# Patient Record
Sex: FEMALE | Race: WHITE | HISPANIC OR LATINO | URBAN - METROPOLITAN AREA
[De-identification: names, ages, dates, MRNs, and addresses within clinical notes are randomized per-mention and may not be internally consistent; named-entity substitution may affect disease eponyms.]

---

## 2019-04-18 RX ORDER — CIPROFLOXACIN LACTATE 400MG/40ML
1 VIAL (ML) INTRAVENOUS
Qty: 0 | Refills: 0 | COMMUNITY
Start: 2019-04-18

## 2019-04-24 ENCOUNTER — INPATIENT (INPATIENT)
Facility: HOSPITAL | Age: 44
LOS: 1 days | Discharge: HOME | DRG: 192 | End: 2019-04-26
Attending: INTERNAL MEDICINE | Admitting: INTERNAL MEDICINE
Payer: COMMERCIAL

## 2019-04-24 VITALS
RESPIRATION RATE: 17 BRPM | TEMPERATURE: 98 F | OXYGEN SATURATION: 99 % | SYSTOLIC BLOOD PRESSURE: 109 MMHG | WEIGHT: 154.98 LBS | HEART RATE: 89 BPM | DIASTOLIC BLOOD PRESSURE: 75 MMHG

## 2019-04-24 DIAGNOSIS — Z90.710 ACQUIRED ABSENCE OF BOTH CERVIX AND UTERUS: Chronic | ICD-10-CM

## 2019-04-24 DIAGNOSIS — K31.84 GASTROPARESIS: ICD-10-CM

## 2019-04-24 DIAGNOSIS — R09.89 OTHER SPECIFIED SYMPTOMS AND SIGNS INVOLVING THE CIRCULATORY AND RESPIRATORY SYSTEMS: ICD-10-CM

## 2019-04-24 DIAGNOSIS — K58.9 IRRITABLE BOWEL SYNDROME WITHOUT DIARRHEA: ICD-10-CM

## 2019-04-24 DIAGNOSIS — Z98.891 HISTORY OF UTERINE SCAR FROM PREVIOUS SURGERY: Chronic | ICD-10-CM

## 2019-04-24 DIAGNOSIS — J45.901 UNSPECIFIED ASTHMA WITH (ACUTE) EXACERBATION: ICD-10-CM

## 2019-04-24 DIAGNOSIS — Z98.890 OTHER SPECIFIED POSTPROCEDURAL STATES: Chronic | ICD-10-CM

## 2019-04-24 DIAGNOSIS — Z29.9 ENCOUNTER FOR PROPHYLACTIC MEASURES, UNSPECIFIED: ICD-10-CM

## 2019-04-24 DIAGNOSIS — D72.829 ELEVATED WHITE BLOOD CELL COUNT, UNSPECIFIED: ICD-10-CM

## 2019-04-24 DIAGNOSIS — Z87.2 PERSONAL HISTORY OF DISEASES OF THE SKIN AND SUBCUTANEOUS TISSUE: Chronic | ICD-10-CM

## 2019-04-24 DIAGNOSIS — Z90.49 ACQUIRED ABSENCE OF OTHER SPECIFIED PARTS OF DIGESTIVE TRACT: Chronic | ICD-10-CM

## 2019-04-24 LAB
BASOPHILS # BLD AUTO: 0 K/UL — SIGNIFICANT CHANGE UP (ref 0–0.2)
BASOPHILS NFR BLD AUTO: 0 % — SIGNIFICANT CHANGE UP (ref 0–2)
EOSINOPHIL # BLD AUTO: 0 K/UL — SIGNIFICANT CHANGE UP (ref 0–0.5)
EOSINOPHIL NFR BLD AUTO: 0.1 % — SIGNIFICANT CHANGE UP (ref 0–6)
GAS PNL BLDV: SIGNIFICANT CHANGE UP
HCT VFR BLD CALC: 45.7 % — HIGH (ref 34.5–45)
HGB BLD-MCNC: 15.8 G/DL — HIGH (ref 11.5–15.5)
LYMPHOCYTES # BLD AUTO: 14.4 % — SIGNIFICANT CHANGE UP (ref 13–44)
LYMPHOCYTES # BLD AUTO: 2.3 K/UL — SIGNIFICANT CHANGE UP (ref 1–3.3)
MCHC RBC-ENTMCNC: 31.6 PG — SIGNIFICANT CHANGE UP (ref 27–34)
MCHC RBC-ENTMCNC: 34.6 GM/DL — SIGNIFICANT CHANGE UP (ref 32–36)
MCV RBC AUTO: 91.3 FL — SIGNIFICANT CHANGE UP (ref 80–100)
MONOCYTES # BLD AUTO: 0.4 K/UL — SIGNIFICANT CHANGE UP (ref 0–0.9)
MONOCYTES NFR BLD AUTO: 2.6 % — SIGNIFICANT CHANGE UP (ref 2–14)
NEUTROPHILS # BLD AUTO: 13.5 K/UL — HIGH (ref 1.8–7.4)
NEUTROPHILS NFR BLD AUTO: 82.9 % — HIGH (ref 43–77)
NT-PROBNP SERPL-SCNC: 20 PG/ML — SIGNIFICANT CHANGE UP (ref 0–300)
PLATELET # BLD AUTO: 313 K/UL — SIGNIFICANT CHANGE UP (ref 150–400)
RAPID RVP RESULT: SIGNIFICANT CHANGE UP
RBC # BLD: 5 M/UL — SIGNIFICANT CHANGE UP (ref 3.8–5.2)
RBC # FLD: 12.3 % — SIGNIFICANT CHANGE UP (ref 10.3–14.5)
TROPONIN T, HIGH SENSITIVITY RESULT: <6 NG/L — SIGNIFICANT CHANGE UP (ref 0–51)
WBC # BLD: 16.3 K/UL — HIGH (ref 3.8–10.5)
WBC # FLD AUTO: 16.3 K/UL — HIGH (ref 3.8–10.5)

## 2019-04-24 PROCEDURE — 71275 CT ANGIOGRAPHY CHEST: CPT | Mod: 26

## 2019-04-24 PROCEDURE — 99285 EMERGENCY DEPT VISIT HI MDM: CPT

## 2019-04-24 PROCEDURE — 99223 1ST HOSP IP/OBS HIGH 75: CPT

## 2019-04-24 PROCEDURE — 71045 X-RAY EXAM CHEST 1 VIEW: CPT | Mod: 26

## 2019-04-24 RX ORDER — ESCITALOPRAM OXALATE 10 MG/1
20 TABLET, FILM COATED ORAL DAILY
Qty: 0 | Refills: 0 | Status: DISCONTINUED | OUTPATIENT
Start: 2019-04-24 | End: 2019-04-26

## 2019-04-24 RX ORDER — IPRATROPIUM/ALBUTEROL SULFATE 18-103MCG
3 AEROSOL WITH ADAPTER (GRAM) INHALATION
Qty: 0 | Refills: 0 | Status: COMPLETED | OUTPATIENT
Start: 2019-04-24 | End: 2019-04-24

## 2019-04-24 RX ORDER — ENOXAPARIN SODIUM 100 MG/ML
40 INJECTION SUBCUTANEOUS EVERY 24 HOURS
Qty: 0 | Refills: 0 | Status: DISCONTINUED | OUTPATIENT
Start: 2019-04-24 | End: 2019-04-26

## 2019-04-24 RX ORDER — ERYTHROMYCIN ETHYLSUCCINATE 400 MG
500 TABLET ORAL
Qty: 0 | Refills: 0 | Status: DISCONTINUED | OUTPATIENT
Start: 2019-04-24 | End: 2019-04-26

## 2019-04-24 RX ORDER — ACETAMINOPHEN 500 MG
650 TABLET ORAL EVERY 6 HOURS
Qty: 0 | Refills: 0 | Status: DISCONTINUED | OUTPATIENT
Start: 2019-04-24 | End: 2019-04-26

## 2019-04-24 RX ORDER — FAMOTIDINE 10 MG/ML
20 INJECTION INTRAVENOUS DAILY
Qty: 0 | Refills: 0 | Status: DISCONTINUED | OUTPATIENT
Start: 2019-04-24 | End: 2019-04-26

## 2019-04-24 RX ORDER — SODIUM CHLORIDE 9 MG/ML
1000 INJECTION INTRAMUSCULAR; INTRAVENOUS; SUBCUTANEOUS ONCE
Qty: 0 | Refills: 0 | Status: COMPLETED | OUTPATIENT
Start: 2019-04-24 | End: 2019-04-24

## 2019-04-24 RX ORDER — IPRATROPIUM/ALBUTEROL SULFATE 18-103MCG
3 AEROSOL WITH ADAPTER (GRAM) INHALATION EVERY 4 HOURS
Qty: 0 | Refills: 0 | Status: DISCONTINUED | OUTPATIENT
Start: 2019-04-24 | End: 2019-04-26

## 2019-04-24 RX ORDER — MAGNESIUM SULFATE 500 MG/ML
2 VIAL (ML) INJECTION ONCE
Qty: 0 | Refills: 0 | Status: COMPLETED | OUTPATIENT
Start: 2019-04-24 | End: 2019-04-24

## 2019-04-24 RX ADMIN — Medication 650 MILLIGRAM(S): at 22:14

## 2019-04-24 RX ADMIN — Medication 3 MILLILITER(S): at 20:19

## 2019-04-24 RX ADMIN — ESCITALOPRAM OXALATE 20 MILLIGRAM(S): 10 TABLET, FILM COATED ORAL at 22:12

## 2019-04-24 RX ADMIN — Medication 1 DROP(S): at 22:13

## 2019-04-24 RX ADMIN — SODIUM CHLORIDE 1000 MILLILITER(S): 9 INJECTION INTRAMUSCULAR; INTRAVENOUS; SUBCUTANEOUS at 15:29

## 2019-04-24 RX ADMIN — Medication 3 MILLILITER(S): at 15:15

## 2019-04-24 RX ADMIN — Medication 650 MILLIGRAM(S): at 22:45

## 2019-04-24 RX ADMIN — ENOXAPARIN SODIUM 40 MILLIGRAM(S): 100 INJECTION SUBCUTANEOUS at 20:19

## 2019-04-24 RX ADMIN — Medication 3 MILLILITER(S): at 15:00

## 2019-04-24 RX ADMIN — Medication 100 MILLIGRAM(S): at 20:24

## 2019-04-24 RX ADMIN — Medication 50 GRAM(S): at 15:34

## 2019-04-24 RX ADMIN — FAMOTIDINE 20 MILLIGRAM(S): 10 INJECTION INTRAVENOUS at 22:15

## 2019-04-24 RX ADMIN — Medication 3 MILLILITER(S): at 14:50

## 2019-04-24 RX ADMIN — Medication 40 MILLIGRAM(S): at 22:13

## 2019-04-24 RX ADMIN — Medication 500 MILLIGRAM(S): at 22:12

## 2019-04-24 RX ADMIN — Medication 125 MILLIGRAM(S): at 15:28

## 2019-04-24 NOTE — H&P ADULT - NSICDXPASTSURGICALHX_GEN_ALL_CORE_FT
PAST SURGICAL HISTORY:  H/O  section     History of appendectomy     History of cyst of breast Bilateral s/p removal    History of hysterectomy     History of meniscectomy of right knee     S/P cholecystectomy

## 2019-04-24 NOTE — ED PROVIDER NOTE - CLINICAL SUMMARY MEDICAL DECISION MAKING FREE TEXT BOX
Cory: 43 year old female with pmhx of asthma here with sob x 5 days worsening. trying nebs at home with no improvement. feels like something sitting on chest preventing her from taking a full breath. saturating 100% on RA. will get labs, cxr, duonebs, steroids, Mg, CTA to evaluate for PE, reasess, low threshold to admit patient.

## 2019-04-24 NOTE — H&P ADULT - PROBLEM SELECTOR PLAN 4
-Likely due to steroids patient received and was on at home.   -Monitor off of abx for now.  -Monitor CBC.  -Repeat lactate which was mildly elevated on admission.

## 2019-04-24 NOTE — H&P ADULT - PROBLEM SELECTOR PLAN 3
-Home med of Trulance not on formulary here, patient can likely take if she has her own supply.  -C/w home Lexapro.

## 2019-04-24 NOTE — H&P ADULT - NSHPPHYSICALEXAM_GEN_ALL_CORE
PHYSICAL EXAM:  Vital Signs Last 24 Hrs  T(C): 36.8 (04-24-19 @ 20:07)  T(F): 98.3 (04-24-19 @ 20:07), Max: 98.3 (04-24-19 @ 20:07)  HR: 103 (04-24-19 @ 20:07) (88 - 103)  BP: 137/82 (04-24-19 @ 20:07)  BP(mean): --  RR: 20 (04-24-19 @ 20:07) (17 - 26)  SpO2: 97% (04-24-19 @ 20:07) (97% - 99%)  Wt(kg): --    Constitutional: NAD, awake and alert, speaking in full sentences  EYES: EOMI  ENT:  Normal Hearing   Neck: Soft and supple  Respiratory: Clear but diminished BS bilaterally. ?Trace wheeze.   Cardiovascular: S1S2 normal, mildly tachy rate and rhythm, no Murmurs, gallops or rubs  Gastrointestinal: Bowel Sounds present, soft, nontender, nondistended, no guarding, no rebound  Extremities: No LE edema. ?tenderness of LE's.   Neurological: AAO x 3, no focal deficits  Skin: No rashes  Psych: No depression or anhedonia  Heme: Trace bruises on LE's.

## 2019-04-24 NOTE — H&P ADULT - NSHPLABSRESULTS_GEN_ALL_CORE
LABS:                        15.8   16.3  )-----------( 313      ( 24 Apr 2019 15:38 )             45.7       04-24    137  |  100  |  16  ----------------------------<  117<H>  3.7   |  23  |  0.68    Ca    10.0      24 Apr 2019 15:38    TPro  7.9  /  Alb  4.7  /  TBili  1.0  /  DBili  x   /  AST  16  /  ALT  12  /  AlkPhos  105  04-24          CAPILLARY BLOOD GLUCOSE    Lactate Trend    RADIOLOGY:    < from: CT Angio Chest w/ IV Cont (04.24.19 @ 19:09) >    IMPRESSION:   No evidence of pulmonary embolus, as clinically questioned.    < end of copied text >    < from: Xray Chest 1 View AP/PA (04.24.19 @ 16:56) >    INTERPRETATION:  no emergent findings    < end of copied text >    CARDIOLOGY:    EKG reviewed by me: NSR HR 90bpm. QTc 442. Sinus arrythmia. ?TWI V1.

## 2019-04-24 NOTE — H&P ADULT - PROBLEM SELECTOR PLAN 1
-Patient presents with acute on chronic asthma, with worsening SOB/MELENDREZ, cough, and wheeze.   -Improved with supplemental O2, continue for now and pulse ox.   -S/p Solumedrol 125mg IV in ED. -C/w Solumedrol 40mg IV Q8H for now.   -C/w Duonebs Q4H.   -Patient was advised by her pulmonologist (Dr. Miller) to be seen by Dr. Perkins ([place] [facility]). Place consult in am.   -Cough meds PRN.   -Home meds of Daliresp, Lonhala, Perforomist not on formulary here. -Clarify with pulmonology if should continue giving these or change her meds. If to continue, then perhaps patient can take her own meds if she has them.   -Gets Xolair injections every 2 weeks as an outpatient.   -Hold Levaquin which patient was getting since 4/18, as no signs of PNA on imaging. -Will check procalcitonin.   -CTA chest negative for PE. -Will check US duplex of LE's to r/o DVT. Has had recent long car rides. -Will also check D-dimer, though non-specific.   -Chest tightness likely due to asthma exacerbation. No acute ischemic changes seen on EKG. Trops negative. BNP normal. -Obtain echocardiogram report patient recently had done.

## 2019-04-24 NOTE — H&P ADULT - NSHPREVIEWOFSYSTEMS_GEN_ALL_CORE
REVIEW OF SYSTEMS:    CONSTITUTIONAL: No fevers or chills  ENMT:  No ear pain, No vertigo or throat pain  EYES: No visual changes  or photophobia  NECK: No pain or stiffness  RESPIRATORY: Cough, wheezing, and shortness of breath/MELENDREZ present.   CARDIOVASCULAR: Chest tightness.   GASTROINTESTINAL: Patient has history of symptoms from gastroparesis and IBS.   MUSCULOSKELETAL: No joint swelling or warmth.  GENITOURINARY: Overactive bladder symptoms.   NEUROLOGICAL: No numbness or weakness  PSYCHIATRIC: No depression or anhedonia.  ENDOCRINE: No sx hypoglycemic episodes.  SKIN: No itching, burning, rashes, or lesions

## 2019-04-24 NOTE — ED PROVIDER NOTE - PROGRESS NOTE DETAILS
PA Barretta: peak flow 175 prior to treatment AKILA Mccall: patient remains tachypneic with diminished breath sounds at the bases b/l. SpO2 98%. Patient stated symptoms minimally improved and continues to endorse chest tightness. Made ED attending Dr. Ray aware who agreed with CTA chest r/o PE AKILA Mccall: called Dr. Acuña who recommended admission to Dr. Medina

## 2019-04-24 NOTE — H&P ADULT - ATTENDING COMMENTS
Dickson Smith MD  [facility]  Cell: [phone]  Pager: [phone]  Office: [phone]

## 2019-04-24 NOTE — ED PROVIDER NOTE - OBJECTIVE STATEMENT
44 y/o female PMHx asthma (last hospitalization 5 years ago), right sided pneumothorax during child birth requiring chest tube/intubation presented to the ED for worsening SOB x5 days. Patient stated she feels dizzy, lightheaded, chest tightness, MELENDREZ, difficulty speaking, nonproductive cough and wheezing with no improvement of nebs (last dose at 11AM) prompting pt to present t the ED. Patient stated 5 days ago she had five asthma exacerbations while seeing her sister in [place] which is 5 hour car ride. Patient pulm Dr. Manuel Miller aware patient is in the ED. Patient gets Omalizumab injections every 2 weeks (last dose 6 days ago) and takes daily  Longhala, Formoterol, albuterol prn, and proair as rescue. Patient denied fevers, abdominal pain, sick contacts, N/V/D, urinary complaints 42 y/o female PMHx asthma (last hospitalization 5 years ago), right sided pneumothorax during child birth requiring chest tube/intubation presented to the ED for worsening SOB x5 days. Patient stated she feels dizzy, lightheaded, chest tightness, MELENDREZ, difficulty speaking, nonproductive cough and wheezing with no improvement of nebs (last dose at 11AM) prompting pt to present t the ED. Patient stated 5 days ago she had five asthma exacerbations while seeing her sister in [place] which is 5 hour car ride. Patient pulm Dr. Manuel Miller aware patient is in the ED. Patient gets Omalizumab injections every 2 weeks (last dose 6 days ago) and takes daily  Longhala, Formoterol, albuterol prn, and proair as rescue. Patient denied fevers, abdominal pain, sick contacts, N/V/D, urinary complaints    Dr. Miller recommended admission to Dr. Acuña

## 2019-04-24 NOTE — H&P ADULT - NSICDXFAMILYHX_GEN_ALL_CORE_FT
FAMILY HISTORY:  Family history of asthma in mother  Family history of heart disease  Family history of hyperthyroidism

## 2019-04-24 NOTE — H&P ADULT - NSICDXPASTMEDICALHX_GEN_ALL_CORE_FT
PAST MEDICAL HISTORY:  Asthma     Gastroparesis     GERD without esophagitis     IBS (irritable bowel syndrome)     Overactive bladder     Plantar fasciitis, right     Pneumothorax on right 2006

## 2019-04-24 NOTE — ED ADULT NURSE REASSESSMENT NOTE - NS ED NURSE REASSESS COMMENT FT1
Pt received bed assignment. Pt aware. Report given to RNDorie MCARTHUR. Pt stable for transport. Chart given to charge desk. Will cont to monitor. Patient taken to CT then to be transported to floor.

## 2019-04-24 NOTE — H&P ADULT - ASSESSMENT
43 year old female with PMH of asthma, right sided pneumothorax during child birth requiring chest tube/intubation, gastroparesis, GERD, IBS, overactive bladder, ?endometriosis; recent trip from [place] about 5 days ago and while there had asthma exacerbation put on prednisone/Levaquin for 10 day course; presents with worsening SOB/MELENDREZ, cough, difficulty speaking, chest tightness, and wheezing; sent by her pulmonologist Dr. Miller to be admitted and seen by Dr. Perkins. Patient reports the past month her asthma has been worsening. Admitted for asthma exacerbation.

## 2019-04-24 NOTE — ED ADULT NURSE NOTE - OBJECTIVE STATEMENT
43 year old female patient presents ambulatory to ED c/o SOB and asthma exacerbation x 5 days. Patient state she used her rescue inhaler and has had little relief- last as 11am. Patient reports associated CP and dizziness. Patient able to speak in full sentences but with some difficulty. Patient denies sick contacts, fevers, or chills. Patient aware of plan of care.

## 2019-04-24 NOTE — ED ADULT NURSE NOTE - NSIMPLEMENTINTERV_GEN_ALL_ED
Implemented All Universal Safety Interventions:  [place] to call system. Call bell, personal items and telephone within reach. Instruct patient to call for assistance. Room bathroom lighting operational. Non-slip footwear when patient is off stretcher. Physically safe environment: no spills, clutter or unnecessary equipment. Stretcher in lowest position, wheels locked, appropriate side rails in place.

## 2019-04-24 NOTE — H&P ADULT - HISTORY OF PRESENT ILLNESS
43 year old female with PMH of asthma, right sided pneumothorax during child birth requiring chest tube/intubation, gastroparesis, GERD, IBS, overactive bladder, ?endometriosis; recent trip from [place] about 5 days ago and while there had asthma exacerbation put on prednisone/Levaquin for 10 day course; presents with worsening SOB/MELENDREZ, cough, difficulty speaking, chest tightness, and wheezing; sent by her pulmonologist Dr. Miller to be admitted and seen by Dr. Perkins. Patient reports the past month her asthma has been worsening.   In ED, patient received Solumedrol 125mg IV, Mg 2gm, Duonebs, and 1LNS.   She was recently seen by a cardiologist but did not complete the stress test.

## 2019-04-25 DIAGNOSIS — K21.9 GASTRO-ESOPHAGEAL REFLUX DISEASE WITHOUT ESOPHAGITIS: ICD-10-CM

## 2019-04-25 DIAGNOSIS — J45.909 UNSPECIFIED ASTHMA, UNCOMPLICATED: ICD-10-CM

## 2019-04-25 LAB
ANION GAP SERPL CALC-SCNC: 15 MMOL/L — SIGNIFICANT CHANGE UP (ref 5–17)
BUN SERPL-MCNC: 12 MG/DL — SIGNIFICANT CHANGE UP (ref 7–23)
CALCIUM SERPL-MCNC: 8.8 MG/DL — SIGNIFICANT CHANGE UP (ref 8.4–10.5)
CHLORIDE SERPL-SCNC: 107 MMOL/L — SIGNIFICANT CHANGE UP (ref 96–108)
CO2 SERPL-SCNC: 19 MMOL/L — LOW (ref 22–31)
CREAT SERPL-MCNC: 0.53 MG/DL — SIGNIFICANT CHANGE UP (ref 0.5–1.3)
D DIMER BLD IA.RAPID-MCNC: <150 NG/ML DDU — SIGNIFICANT CHANGE UP
GLUCOSE SERPL-MCNC: 143 MG/DL — HIGH (ref 70–99)
HCT VFR BLD CALC: 42 % — SIGNIFICANT CHANGE UP (ref 34.5–45)
HGB BLD-MCNC: 14 G/DL — SIGNIFICANT CHANGE UP (ref 11.5–15.5)
LACTATE SERPL-SCNC: 2.6 MMOL/L — HIGH (ref 0.7–2)
MCHC RBC-ENTMCNC: 30.8 PG — SIGNIFICANT CHANGE UP (ref 27–34)
MCHC RBC-ENTMCNC: 33.3 GM/DL — SIGNIFICANT CHANGE UP (ref 32–36)
MCV RBC AUTO: 92.5 FL — SIGNIFICANT CHANGE UP (ref 80–100)
PLATELET # BLD AUTO: 310 K/UL — SIGNIFICANT CHANGE UP (ref 150–400)
POTASSIUM SERPL-MCNC: 4.3 MMOL/L — SIGNIFICANT CHANGE UP (ref 3.5–5.3)
POTASSIUM SERPL-SCNC: 4.3 MMOL/L — SIGNIFICANT CHANGE UP (ref 3.5–5.3)
PROCALCITONIN SERPL-MCNC: 0.04 NG/ML — SIGNIFICANT CHANGE UP (ref 0.02–0.1)
RBC # BLD: 4.54 M/UL — SIGNIFICANT CHANGE UP (ref 3.8–5.2)
RBC # FLD: 13.5 % — SIGNIFICANT CHANGE UP (ref 10.3–14.5)
SODIUM SERPL-SCNC: 141 MMOL/L — SIGNIFICANT CHANGE UP (ref 135–145)
WBC # BLD: 17.32 K/UL — HIGH (ref 3.8–10.5)
WBC # FLD AUTO: 17.32 K/UL — HIGH (ref 3.8–10.5)

## 2019-04-25 PROCEDURE — 99223 1ST HOSP IP/OBS HIGH 75: CPT | Mod: GC

## 2019-04-25 PROCEDURE — 93970 EXTREMITY STUDY: CPT | Mod: 26

## 2019-04-25 RX ORDER — IBUPROFEN 200 MG
400 TABLET ORAL ONCE
Qty: 0 | Refills: 0 | Status: COMPLETED | OUTPATIENT
Start: 2019-04-25 | End: 2019-04-25

## 2019-04-25 RX ORDER — PANTOPRAZOLE SODIUM 20 MG/1
40 TABLET, DELAYED RELEASE ORAL
Qty: 0 | Refills: 0 | Status: DISCONTINUED | OUTPATIENT
Start: 2019-04-25 | End: 2019-04-26

## 2019-04-25 RX ORDER — ERYTHROMYCIN ETHYLSUCCINATE 400 MG
1 TABLET ORAL
Qty: 0 | Refills: 0 | COMMUNITY

## 2019-04-25 RX ORDER — ESCITALOPRAM OXALATE 10 MG/1
1 TABLET, FILM COATED ORAL
Qty: 0 | Refills: 0 | COMMUNITY

## 2019-04-25 RX ORDER — BUDESONIDE AND FORMOTEROL FUMARATE DIHYDRATE 160; 4.5 UG/1; UG/1
2 AEROSOL RESPIRATORY (INHALATION)
Qty: 0 | Refills: 0 | Status: DISCONTINUED | OUTPATIENT
Start: 2019-04-25 | End: 2019-04-26

## 2019-04-25 RX ADMIN — Medication 400 MILLIGRAM(S): at 00:51

## 2019-04-25 RX ADMIN — BUDESONIDE AND FORMOTEROL FUMARATE DIHYDRATE 2 PUFF(S): 160; 4.5 AEROSOL RESPIRATORY (INHALATION) at 17:14

## 2019-04-25 RX ADMIN — Medication 650 MILLIGRAM(S): at 05:34

## 2019-04-25 RX ADMIN — Medication 100 MILLIGRAM(S): at 05:10

## 2019-04-25 RX ADMIN — Medication 400 MILLIGRAM(S): at 01:30

## 2019-04-25 RX ADMIN — Medication 3 MILLILITER(S): at 15:45

## 2019-04-25 RX ADMIN — Medication 3 MILLILITER(S): at 11:34

## 2019-04-25 RX ADMIN — PANTOPRAZOLE SODIUM 40 MILLIGRAM(S): 20 TABLET, DELAYED RELEASE ORAL at 15:45

## 2019-04-25 RX ADMIN — Medication 500 MILLIGRAM(S): at 05:04

## 2019-04-25 RX ADMIN — Medication 3 MILLILITER(S): at 00:15

## 2019-04-25 RX ADMIN — Medication 1 DROP(S): at 22:18

## 2019-04-25 RX ADMIN — Medication 1 DROP(S): at 05:07

## 2019-04-25 RX ADMIN — Medication 400 MILLIGRAM(S): at 18:20

## 2019-04-25 RX ADMIN — Medication 500 MILLIGRAM(S): at 17:14

## 2019-04-25 RX ADMIN — ESCITALOPRAM OXALATE 20 MILLIGRAM(S): 10 TABLET, FILM COATED ORAL at 11:34

## 2019-04-25 RX ADMIN — Medication 650 MILLIGRAM(S): at 06:13

## 2019-04-25 RX ADMIN — Medication 40 MILLIGRAM(S): at 05:04

## 2019-04-25 RX ADMIN — Medication 40 MILLIGRAM(S): at 15:45

## 2019-04-25 RX ADMIN — Medication 3 MILLILITER(S): at 04:00

## 2019-04-25 RX ADMIN — FAMOTIDINE 20 MILLIGRAM(S): 10 INJECTION INTRAVENOUS at 11:34

## 2019-04-25 RX ADMIN — Medication 3 MILLILITER(S): at 07:55

## 2019-04-25 RX ADMIN — ENOXAPARIN SODIUM 40 MILLIGRAM(S): 100 INJECTION SUBCUTANEOUS at 22:18

## 2019-04-25 RX ADMIN — Medication 1 DROP(S): at 15:46

## 2019-04-25 RX ADMIN — Medication 3 MILLILITER(S): at 22:18

## 2019-04-25 NOTE — CONSULT NOTE ADULT - ATTENDING COMMENTS
Patient seen and examined. Imaging and laboratory data reviewed. Patient has a reported history of severe persistent asthma and is on multiple bronchodilators (some overlapping) and Xolair. Over the last few months she has noted progression in her symptoms to the point of exercise limitation. She notes difficulty walking up stairs or doing physical activity. She feels better with supplemental oxygen but has not had any episodes of desaturation here at [facility] and has not qualified for O2 as an outpatient. Her symptoms consist of dyspnea, palpitations, cough and chest pressure. She states she DOES NOT get any wheezing during these episodes. She had a CTPA that did not reveal PE or significant parenchymal disease but did show linear atelectasis. She has had recent cardiac workup (stress test and echocardiogram) which were normal. She is admitted for an asthma exacerbation but her symptoms are not fully consistent with asthma.     - Given that she has had similar symptoms for 13 years without a clear answer and a progression over the last few months I am not sure we will be able to find an answer to her illness. However, I do think she would benefit from ENT evaluation for VCD and from Psychiatry evaluation for her anxiety. She states that she does have fairly significant anxiety which can complicate her respiratory status.    - From a pulmonary standpoint I would suggest trying to simplify her medications: Please change Solumedrol to Prednisone 40mg QD and taper by 10mg Q3 days. I do not hear any wheezing but am hesitant to stop outright as she keeps going back and forth on oral steroids. Would put her on Duonebs Q6 ATC and Symbicort BID. Would hold ALL her home therapies (she was on Lonhala (LAMA), Spiriva (LAMA), Performist (ICS), Duoneb (KENNETH/CASANDRA), Xolair, and Breo (LABA/ICS). We will need to see her pulmonary function testing. She may also require outpatient Cardiopulmonary Exercise Testing (CPET) for further diagnostic workup. Today she asked us about the potential of bronchial thermoplasty, which is used in some cases of severe asthma. However, I would need more information regarding her asthma prior to making a comment whether this would be useful or not. However, her absence of wheezing is confusing.    - I would also suggest a more aggressive treatment of her GERD. She states she does have chronic heartburn and gastroparesis which is all likely worsened by her steroid usage and anxiety.    - Suspected sleep disorder breathing. Would suggest continuing patients home NIPPV. She states she is on CPAP 5 cm H20.

## 2019-04-25 NOTE — CONSULT NOTE ADULT - ASSESSMENT
43F with diagnosis of Asthma and obstructive lung disease on several long acting anticholinergics/muscarinics and KENNETH with recurrent tachycardia, heart palpitations, difficulty breathing and dyspnea associated with anxiety or exertion with no evidence of cardiomyopathy, parenchymal lung inflammation, chest lympadenopathy or disease but with reported obstructive lung disease on inhaled/oral steroids without wheezing. Differential diagnosis includes anxiety, endocrine pathology, bronchial asthma.    - RVP negative and no evidence of hypoxia or pulmonary arterial thromboembolism.  - Please continue Duonebs q 6 for the next 24 hours. Please stop Solumedrol and switch to Prednisone 40mg daily.  - Please hold all long acting antimuscarinic/anticholinergic meds for now.  - Please have ENT evaluate for possible vocal cord dysfunction with a laryngoscopy.  - Please obtain serum/urine Metanephrines, TSH, Serum 5-HIAA,   - Please also check JAIRO, Sjogren's antibodies, ISAIAS.  - Please start Protonix and Pepcid for her GERD which could be worsened by anxiety.  - Patient will try to obtain a copy of her PFT's and Sleep study. 43F with diagnosis of Asthma and obstructive lung disease on several long acting anticholinergics/muscarinics and KENNETH with recurrent tachycardia, heart palpitations, difficulty breathing and dyspnea associated with anxiety or exertion with no evidence of cardiomyopathy, parenchymal lung inflammation, chest lympadenopathy or disease but with reported obstructive lung disease on inhaled/oral steroids without wheezing. Differential diagnosis includes anxiety, endocrine pathology, bronchial asthma.    - RVP negative and no evidence of hypoxia or pulmonary arterial thromboembolism.  - CBC differential doesn't show elevated Eosinophils but patient is on Xolair.   - Please continue Duonebs q 6 for the next 24 hours. Please stop Solumedrol and switch to Prednisone 40mg daily.  - Please hold all long acting antimuscarinic/anticholinergic meds for now.  - Please have ENT evaluate for possible vocal cord dysfunction with a laryngoscopy.  - Please obtain serum/urine Metanephrines, TSH, Serum 5-HIAA, AM cortisol.  - Please also check JAIRO, Sjogren's antibodies, ISAIAS.  - Please start Protonix and Pepcid for her GERD which could be worsened by anxiety.  - Patient will try to obtain a copy of her PFT's and Sleep study. 43F with diagnosis of Asthma and obstructive lung disease on several long acting anticholinergics/muscarinics and KENNETH with recurrent tachycardia, heart palpitations, difficulty breathing and dyspnea associated with anxiety or exertion with no evidence of cardiomyopathy, parenchymal lung inflammation, chest lympadenopathy or disease but with reported obstructive lung disease on inhaled/oral steroids without wheezing. Differential diagnosis includes anxiety, endocrine pathology, bronchial asthma.    - RVP negative and no evidence of hypoxia or pulmonary arterial thromboembolism.  - CBC differential doesn't show elevated Eosinophils but patient is on Xolair.   - Please continue Duonebs q 6 for the next 24 hours. Please stop Solumedrol and switch to Prednisone 40mg daily.  - Please hold all long acting antimuscarinic/anticholinergic meds for now - and start patient on Symbicort BID  - Please have ENT evaluate for possible vocal cord dysfunction with a laryngoscopy.  - Please obtain serum/urine Metanephrines, TSH, Serum 5-HIAA, AM cortisol.  - Please also check JAIRO, Sjogren's antibodies, ISAIAS.  - Please start Protonix and Pepcid for her GERD which could be worsened by anxiety.  - Patient will try to obtain a copy of her PFT's and Sleep study.

## 2019-04-25 NOTE — CONSULT NOTE ADULT - SUBJECTIVE AND OBJECTIVE BOX
CHIEF COMPLAINT: dyspnea    HPI:  43F with h/o Psoriatic arthritis not meds, chronic dry eyes/mouth, Gastroparesis, IBS, GERD, h/o spontaneous PTX during Labor 13yrs ago, Endometriosis/fibroids s/p hysterectomy and Asthma since childhood presents with 3 months of asthma exacerbation acutely worsened in the past 3 weeks before admission yesterday.  Patient describes episodes of dyspnea, chest tightness/pressure, heart palpitations and feelings of gasping with lack of air usually associated with anything that increases her heart rate. She never wheezes. She has never wheezed ever since being diagnosed with Asthma. She has symptoms at work, in her car, at home. She has no specific trigger. She is on Darilesp, Lonhala, Spiriva, Duonebs, Albuterol rescue inhaler. She use to be on Breo Ellipta but this was stopped when she got placed on Xolair 1.5yrs ago. She gets Xolair injections q 2weeks and got her last injection 1 week ago. She has also been on occasional oral steroids. She has gained 20lbs in the last 8 months also.  The most recent exacerbation started 3 weeks ago. [place] and was [place] with antibiotics without relief. She saw her Allergist for the Xolair injections and was placed on a Medrol dose pack taper which she was taking up till admission. She was at work and felt like she could not breath, severe dyspnea with exertion prompting hospitalization.  She  has never been intubated for an asthma attack. She was started on Escitalopram which she takes q other day to help manage some anxiety. She has had a TTE, stress test and Man monitoring in the past 3 weeks. The TTE showed normal LV function with EF of 62% and no evidence of Right heart disease or pulmonary HTN. The stress test was negative for ischemia but HR increased to 150. Per patient, she has had PFT's in the past most recent 3 weeks ago showing reduced volumes and signs of obstruction. She has also had a PSG and has a CPAP at home with low pressure but this doesn't help her. She is not on oxygen at home.  Since admission, patient received Solumedrol 125, Duonebs and Magnesium. After Magnesium and oxygen, she is feeling better compared to on admission.  She denies rash but has had recent easy bruising.    PAST MEDICAL & SURGICAL HISTORY:  Plantar fasciitis, right  Overactive bladder  IBS (irritable bowel syndrome)  Gastroparesis  GERD without esophagitis  Pneumothorax on right:   Asthma  History of cyst of breast: Bilateral s/p removal  History of meniscectomy of right knee  History of appendectomy  H/O  section  S/P cholecystectomy  History of hysterectomy      FAMILY HISTORY:  Family history of heart disease  Family history of hyperthyroidism  Family history of asthma in mother      SOCIAL HISTORY:  Smoking: [x ] Never Smoked   Substance Use: [ ] Never Used [ ] Used ____  EtOH Use:  Marital Status: [ ] Single [x]  [ ]  [ ]   Sexual History:   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  Recent Travel:  Country of Birth:  Advance Directives:    Allergies    clindamycin (Rash)  latex (Rash)  lidocaine (Stomach Upset; Vomiting; Nausea)    Intolerances        HOME MEDICATIONS:    REVIEW OF SYSTEMS:  Constitutional: [ ] negative [ ] fevers [ ] chills [ ] weight loss [x ] weight gain  HEENT: [x ] negative [ ] dry eyes [ ] eye irritation [ ] postnasal drip [ ] nasal congestion  CV: [ ] negative  [x ] chest pain [ ] orthopnea [x ] palpitations [ ] murmur  Resp: [ ] negative [ x] cough [ ] shortness of breath [x ] dyspnea [ ] wheezing [ ] sputum [ ] hemoptysis  GI: [ ] negative [ ] nausea [ ] vomiting [ ] diarrhea [ ] constipation [ ] abd pain [ ] dysphagia   : [x ] negative [ ] dysuria [ ] nocturia [ ] hematuria [ ] increased urinary frequency  Musculoskeletal: [x ] negative [ ] back pain [ ] myalgias [ ] arthralgias [ ] fracture  Skin: [ ] negative [ ] rash [x ] bruising  Neurological: [ ] negative [x ] headache [ ] dizziness [ ] syncope [ ] weakness [ ] numbness  Psychiatric: [ ] negative [x ] anxiety [ ] depression  Endocrine: [x ] negative [ ] diabetes [ ] thyroid problem  Hematologic/Lymphatic: [ ] negative [ ] anemia [ ] bleeding problem  Allergic/Immunologic: [ ] negative [ ] itchy eyes [ ] nasal discharge [ ] hives [ ] angioedema  [ ] All other systems negative  [ ] Unable to assess ROS because ________    OBJECTIVE:  T(C): 36.8 (2019 20:07), Max: 36.8 (2019 20:07)  T(F): 98.3 (2019 20:07), Max: 98.3 (2019 20:07)  HR: 103 (2019 20:07) (88 - 103)  BP: 137/82 (2019 20:07) (109/75 - 137/82)  RR: 20 (2019 20:07) (17 - 26)  SpO2: 97% (2019 20:07) (97% - 99%)         @ 07:01  -   @ 07:00  --------------------------------------------------------  IN: 270 mL / OUT: 0 mL / NET: 270 mL      CAPILLARY BLOOD GLUCOSE          PHYSICAL EXAM:  General: NAD. Speaking in complete sentences  HEENT: MONIQUE  Lymph Nodes:  Neck: No JVD  Respiratory: CTA b/l. No wheezing. Moving air to bases.  Cardiovascular: RRR no m/r/g  Abdomen: S/NT/ND  Extremities: -C/C/E  Skin: Scattered rare bruise on lower legs, thighs, chin.  Neurological: non-focal  Psychiatry: anxious    HOSPITAL MEDICATIONS:  enoxaparin Injectable 40 milliGRAM(s) SubCutaneous every 24 hours    erythromycin     base Tablet 500 milliGRAM(s) Oral two times a day      methylPREDNISolone sodium succinate Injectable 40 milliGRAM(s) IV Push every 8 hours    ALBUTerol/ipratropium for Nebulization 3 milliLiter(s) Nebulizer every 4 hours  benzonatate 100 milliGRAM(s) Oral every 8 hours PRN  guaiFENesin    Syrup 200 milliGRAM(s) Oral every 6 hours PRN    acetaminophen   Tablet .. 650 milliGRAM(s) Oral every 6 hours PRN  escitalopram 20 milliGRAM(s) Oral daily    famotidine    Tablet 20 milliGRAM(s) Oral daily            artificial  tears Solution 1 Drop(s) Both EYES three times a day        LABS:                        14.0   17.32 )-----------( 310      ( 2019 09:38 )             42.0     Hgb Trend: 14.0<--, 15.8<--      141  |  107  |  12  ----------------------------<  143<H>  4.3   |  19<L>  |  0.53    Ca    8.8      2019 06:31    TPro  7.9  /  Alb  4.7  /  TBili  1.0  /  DBili  x   /  AST  16  /  ALT  12  /  AlkPhos  105      Creatinine Trend: 0.53<--, 0.68<--        Venous Blood Gas:   @ 15:38  7.43/40/34/26/63  VBG Lactate: 2.8      MICROBIOLOGY:     RADIOLOGY:  [x ] Reviewed and interpreted by me  LUNGS AND LARGE AIRWAYS: Patent central airways.  No pulmonary nodules.   Dependent atelectasis at the lung bases.  PLEURA: No pleural effusion.  VESSELS: No pulmonary embolus.  HEART: Heart size is normal. No pericardial effusion.  MEDIASTINUM AND TAWANNA: No lymphadenopathy.  CHEST WALL AND LOWER NECK: Within normal limits.  VISUALIZED UPPER ABDOMEN: Within normal limits.  BONES: Within normal limits.    PULMONARY FUNCTION TESTS:    EKG: CHIEF COMPLAINT: dyspnea    HPI:  43F with h/o Psoriatic arthritis not meds, chronic dry eyes/mouth, Gastroparesis, IBS, GERD, h/o spontaneous PTX during Labor 13yrs ago, Endometriosis/fibroids s/p hysterectomy and Asthma since childhood presents with 3 months of asthma exacerbation acutely worsened in the past 3 weeks before admission yesterday.  Patient describes episodes of dyspnea, chest tightness/pressure, heart palpitations and feelings of gasping with lack of air usually associated with anything that increases her heart rate. She never wheezes. She has never wheezed ever since being diagnosed with Asthma. She has symptoms at work, in her car, at home. She has no specific trigger. She is on Darilesp, Lonhala, Spiriva, Duonebs, Albuterol rescue inhaler. She use to be on Breo Ellipta but this was stopped when she got placed on Xolair 1.5yrs ago. She gets Xolair injections q 2weeks and got her last injection 1 week ago. She has also been on occasional oral steroids. She has gained 20lbs in the last 8 months also.  The most recent exacerbation started 3 weeks ago. She travelled to another state and was treated with antibiotics without relief. She saw her Allergist for the Xolair injections and was placed on a Medrol dose pack taper which she was taking up till admission. She was at work and felt like she could not breath, severe dyspnea with exertion prompting hospitalization.  She  has never been intubated for an asthma attack. She was started on Escitalopram which she takes q other day to help manage some anxiety. She has had a TTE, stress test and Man monitoring in the past 3 weeks. The TTE showed normal LV function with EF of 62% and no evidence of Right heart disease or pulmonary HTN. The stress test was negative for ischemia but HR increased to 150. Per patient, she has had PFT's in the past most recent 3 weeks ago showing reduced volumes and signs of obstruction. She has also had a PSG and has a CPAP at home with low pressure but this doesn't help her. She is not on oxygen at home.  Since admission, patient received Solumedrol 125, Duonebs and Magnesium. After Magnesium and oxygen, she is feeling better compared to on admission.  She denies rash but has had recent easy bruising.    PAST MEDICAL & SURGICAL HISTORY:  Plantar fasciitis, right  Overactive bladder  IBS (irritable bowel syndrome)  Gastroparesis  GERD without esophagitis  Pneumothorax on right:   Asthma  History of cyst of breast: Bilateral s/p removal  History of meniscectomy of right knee  History of appendectomy  H/O  section  S/P cholecystectomy  History of hysterectomy      FAMILY HISTORY:  Family history of heart disease  Family history of hyperthyroidism  Family history of asthma in mother      SOCIAL HISTORY:  Smoking: [x ] Never Smoked   Substance Use: [ ] Never Used [ ] Used ____  EtOH Use:  Marital Status: [ ] Single [x]  [ ]  [ ]   Sexual History:   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  Recent Travel:  Country of Birth:  Advance Directives:    Allergies    clindamycin (Rash)  latex (Rash)  lidocaine (Stomach Upset; Vomiting; Nausea)    Intolerances        HOME MEDICATIONS:    REVIEW OF SYSTEMS:  Constitutional: [ ] negative [ ] fevers [ ] chills [ ] weight loss [x ] weight gain  HEENT: [x ] negative [ ] dry eyes [ ] eye irritation [ ] postnasal drip [ ] nasal congestion  CV: [ ] negative  [x ] chest pain [ ] orthopnea [x ] palpitations [ ] murmur  Resp: [ ] negative [ x] cough [ ] shortness of breath [x ] dyspnea [ ] wheezing [ ] sputum [ ] hemoptysis  GI: [ ] negative [ ] nausea [ ] vomiting [ ] diarrhea [ ] constipation [ ] abd pain [ ] dysphagia   : [x ] negative [ ] dysuria [ ] nocturia [ ] hematuria [ ] increased urinary frequency  Musculoskeletal: [x ] negative [ ] back pain [ ] myalgias [ ] arthralgias [ ] fracture  Skin: [ ] negative [ ] rash [x ] bruising  Neurological: [ ] negative [x ] headache [ ] dizziness [ ] syncope [ ] weakness [ ] numbness  Psychiatric: [ ] negative [x ] anxiety [ ] depression  Endocrine: [x ] negative [ ] diabetes [ ] thyroid problem  Hematologic/Lymphatic: [ ] negative [ ] anemia [ ] bleeding problem  Allergic/Immunologic: [ ] negative [ ] itchy eyes [ ] nasal discharge [ ] hives [ ] angioedema  [ x] All other systems negative  [ ] Unable to assess ROS because ________    OBJECTIVE:  T(C): 36.8 (2019 20:07), Max: 36.8 (2019 20:07)  T(F): 98.3 (2019 20:07), Max: 98.3 (2019 20:07)  HR: 103 (2019 20:07) (88 - 103)  BP: 137/82 (2019 20:07) (109/75 - 137/82)  RR: 20 (2019 20:07) (17 - 26)  SpO2: 97% (2019 20:07) (97% - 99%)         @ 07:01  -   @ 07:00  --------------------------------------------------------  IN: 270 mL / OUT: 0 mL / NET: 270 mL      CAPILLARY BLOOD GLUCOSE          PHYSICAL EXAM:  General: NAD. Speaking in complete sentences  HEENT: MONIQUE  Lymph Nodes:  Neck: No JVD  Respiratory: CTA b/l. No wheezing. Moving air to bases.  Cardiovascular: RRR no m/r/g  Abdomen: S/NT/ND  Extremities: -C/C/E  Skin: Scattered rare bruise on lower legs, thighs, chin.  Neurological: non-focal  Psychiatry: anxious    HOSPITAL MEDICATIONS:  enoxaparin Injectable 40 milliGRAM(s) SubCutaneous every 24 hours    erythromycin     base Tablet 500 milliGRAM(s) Oral two times a day      methylPREDNISolone sodium succinate Injectable 40 milliGRAM(s) IV Push every 8 hours    ALBUTerol/ipratropium for Nebulization 3 milliLiter(s) Nebulizer every 4 hours  benzonatate 100 milliGRAM(s) Oral every 8 hours PRN  guaiFENesin    Syrup 200 milliGRAM(s) Oral every 6 hours PRN    acetaminophen   Tablet .. 650 milliGRAM(s) Oral every 6 hours PRN  escitalopram 20 milliGRAM(s) Oral daily    famotidine    Tablet 20 milliGRAM(s) Oral daily            artificial  tears Solution 1 Drop(s) Both EYES three times a day        LABS:                        14.0   17.32 )-----------( 310      ( 2019 09:38 )             42.0     Hgb Trend: 14.0<--, 15.8<--      141  |  107  |  12  ----------------------------<  143<H>  4.3   |  19<L>  |  0.53    Ca    8.8      2019 06:31    TPro  7.9  /  Alb  4.7  /  TBili  1.0  /  DBili  x   /  AST  16  /  ALT  12  /  AlkPhos  105      Creatinine Trend: 0.53<--, 0.68<--        Venous Blood Gas:   @ 15:38  7.43/40/34/26/63  VBG Lactate: 2.8      MICROBIOLOGY:     RADIOLOGY:  [x ] Reviewed and interpreted by me  LUNGS AND LARGE AIRWAYS: Patent central airways.  No pulmonary nodules.   Dependent atelectasis at the lung bases.  PLEURA: No pleural effusion.  VESSELS: No pulmonary embolus.  HEART: Heart size is normal. No pericardial effusion.  MEDIASTINUM AND TAWANNA: No lymphadenopathy.  CHEST WALL AND LOWER NECK: Within normal limits.  VISUALIZED UPPER ABDOMEN: Within normal limits.  BONES: Within normal limits.    PULMONARY FUNCTION TESTS:    EKG:

## 2019-04-25 NOTE — CONSULT NOTE ADULT - ASSESSMENT
43F with h/o Psoriatic arthritis not meds, chronic dry eyes/mouth, Gastroparesis, IBS, GERD, h/o spontaneous PTX during Labor 13yrs ago, Endometriosis/fibroids s/p hysterectomy and Asthma since childhood presents with 3 months of asthma exacerbation acutely worsened in the past 3 weeks. ENT was consulted for Upper Airway evaluation. 43F with h/o Psoriatic arthritis not meds, chronic dry eyes/mouth, Gastroparesis, IBS, GERD, h/o spontaneous PTX during Labor 13yrs ago, Endometriosis/fibroids s/p hysterectomy and Asthma since childhood presents with 3 months of asthma exacerbation acutely worsened in the past 3 weeks. ENT was consulted for Upper Airway evaluation. Indirect Laryngoscopy  with mild erythema at the b/l Arytenoids consistent with GERD.

## 2019-04-25 NOTE — PROGRESS NOTE ADULT - SUBJECTIVE AND OBJECTIVE BOX
Patient is a 43y old  Female who presents with a chief complaint of SOB. (25 Apr 2019 14:55)      INTERVAL HPI/OVERNIGHT EVENTS:  T(C): 37.1 (04-25-19 @ 15:26), Max: 37.3 (04-25-19 @ 13:45)  HR: 90 (04-25-19 @ 15:26) (88 - 103)  BP: 119/72 (04-25-19 @ 15:26) (111/74 - 137/82)  RR: 20 (04-25-19 @ 15:26) (20 - 20)  SpO2: 95% (04-25-19 @ 15:26) (95% - 97%)  Wt(kg): --  I&O's Summary    24 Apr 2019 07:01  -  25 Apr 2019 07:00  --------------------------------------------------------  IN: 270 mL / OUT: 0 mL / NET: 270 mL    25 Apr 2019 07:01  -  25 Apr 2019 17:45  --------------------------------------------------------  IN: 920 mL / OUT: 0 mL / NET: 920 mL        LABS:                        14.0   17.32 )-----------( 310      ( 25 Apr 2019 09:38 )             42.0     04-25    141  |  107  |  12  ----------------------------<  143<H>  4.3   |  19<L>  |  0.53    Ca    8.8      25 Apr 2019 06:31    TPro  7.9  /  Alb  4.7  /  TBili  1.0  /  DBili  x   /  AST  16  /  ALT  12  /  AlkPhos  105  04-24        CAPILLARY BLOOD GLUCOSE                MEDICATIONS  (STANDING):  ALBUTerol/ipratropium for Nebulization 3 milliLiter(s) Nebulizer every 4 hours  artificial  tears Solution 1 Drop(s) Both EYES three times a day  buDESOnide  80 MICROgram(s)/formoterol 4.5 MICROgram(s) Inhaler 2 Puff(s) Inhalation two times a day  enoxaparin Injectable 40 milliGRAM(s) SubCutaneous every 24 hours  erythromycin     base Tablet 500 milliGRAM(s) Oral two times a day  escitalopram 20 milliGRAM(s) Oral daily  famotidine    Tablet 20 milliGRAM(s) Oral daily  ibuprofen  Tablet. 400 milliGRAM(s) Oral once  pantoprazole    Tablet 40 milliGRAM(s) Oral before breakfast  predniSONE   Tablet 40 milliGRAM(s) Oral daily    MEDICATIONS  (PRN):  acetaminophen   Tablet .. 650 milliGRAM(s) Oral every 6 hours PRN Mild Pain (1 - 3), Moderate Pain (4 - 6)  benzonatate 100 milliGRAM(s) Oral every 8 hours PRN Cough  guaiFENesin    Syrup 200 milliGRAM(s) Oral every 6 hours PRN Cough          PHYSICAL EXAM:  GENERAL: NAD, well-groomed, well-developed  HEAD:  Atraumatic, Normocephalic  CHEST/LUNG: Clear to percussion bilaterally; No rales, rhonchi, wheezing, or rubs  HEART: Regular rate and rhythm; No murmurs, rubs, or gallops  ABDOMEN: Soft, Nontender, Nondistended; Bowel sounds present  EXTREMITIES:  2+ Peripheral Pulses, No clubbing, cyanosis, or edema  LYMPH: No lymphadenopathy noted  SKIN: No rashes or lesions    Care Discussed with Consultants/Other Providers [ ] YES  [ ] NO

## 2019-04-25 NOTE — CONSULT NOTE ADULT - SUBJECTIVE AND OBJECTIVE BOX
CC: Vocal cord evaluation.     HPI: 43F with h/o Psoriatic arthritis not meds, chronic dry eyes/mouth, Gastroparesis, IBS, GERD, h/o spontaneous PTX during Labor 13yrs ago, Endometriosis/fibroids s/p hysterectomy and Asthma since childhood presents with 3 months of asthma exacerbation acutely worsened in the past 3 weeks before admission yesterday.         PAST MEDICAL & SURGICAL HISTORY:  Plantar fasciitis, right  Overactive bladder  IBS (irritable bowel syndrome)  Gastroparesis  GERD without esophagitis  Pneumothorax on right:   Asthma  History of cyst of breast: Bilateral s/p removal  History of meniscectomy of right knee  History of appendectomy  H/O  section  S/P cholecystectomy  History of hysterectomy    Allergies.   clindamycin (Rash)  latex (Rash)  lidocaine (Stomach Upset; Vomiting; Nausea)    Intolerances.    MEDICATIONS  (STANDING):  ALBUTerol/ipratropium for Nebulization 3 milliLiter(s) Nebulizer every 4 hours  artificial  tears Solution 1 Drop(s) Both EYES three times a day  buDESOnide  80 MICROgram(s)/formoterol 4.5 MICROgram(s) Inhaler 2 Puff(s) Inhalation two times a day  enoxaparin Injectable 40 milliGRAM(s) SubCutaneous every 24 hours  erythromycin     base Tablet 500 milliGRAM(s) Oral two times a day  escitalopram 20 milliGRAM(s) Oral daily  famotidine    Tablet 20 milliGRAM(s) Oral daily  pantoprazole    Tablet 40 milliGRAM(s) Oral before breakfast  predniSONE   Tablet 40 milliGRAM(s) Oral daily    MEDICATIONS  (PRN):  acetaminophen   Tablet .. 650 milliGRAM(s) Oral every 6 hours PRN Mild Pain (1 - 3), Moderate Pain (4 - 6)  benzonatate 100 milliGRAM(s) Oral every 8 hours PRN Cough  guaiFENesin    Syrup 200 milliGRAM(s) Oral every 6 hours PRN Cough      Social History: No smoking history.     Family history: Family history of heart disease  Family history of hyperthyroidism  Family history of asthma in mother    ROS:   ENT: all negative except as noted in HPI   CV: denies palpitations  Pulm: denies SOB, cough, hemoptysis  GI: denies change in apetite, indigestion, n/v  : denies pertinent urinary symptoms, urgency  Neuro: denies numbness/tingling, loss of sensation  Psych: denies anxiety  MS: denies muscle weakness, instability  Heme: denies easy bruising or bleeding  Endo: denies heat/cold intolerance, excessive sweating  Vascular: denies LE edema    Vital Signs Last 24 Hrs  T(C): 37.3 (2019 13:45), Max: 37.3 (2019 13:45)  T(F): 99.1 (2019 13:45), Max: 99.1 (2019 13:45)  HR: 95 (2019 13:45) (88 - 103)  BP: 131/77 (2019 13:45) (111/74 - 137/82)  BP(mean): --  RR: 20 (2019 13:45) (20 - 26)  SpO2: 95% (2019 13:45) (95% - 99%)                          14.0   17.32 )-----------( 310      ( 2019 09:38 )             42.0        141  |  107  |  12  ----------------------------<  143<H>  4.3   |  19<L>  |  0.53    Ca    8.8      2019 06:31    TPro  7.9  /  Alb  4.7  /  TBili  1.0  /  DBili  x   /  AST  16  /  ALT  12  /  AlkPhos  105  04-24       PHYSICAL EXAM:  Gen: NAD.   Head: Normocephalic, Atraumatic.   Face: no edema, erythema, or fluctuance. Parotid glands soft without mass.   Eyes: no scleral injection.   Nose: Nares bilaterally patent, no discharge.   Mouth: No Stridor / Drooling / Trismus.  Mucosa moist, tongue/uvula midline, oropharynx clear.   Neck: Flat, supple, no lymphadenopathy, trachea midline, no masses.   Lymphatic: No lymphadenopathy.   Resp: breathing easily, no stridor.   CV: no peripheral edema/cyanosis.   GI: nondistended .   Peripheral vascular: no JVD or edema  Neuro: facial nerve intact, no facial droop    Reason for Laryngoscopy: CC: Upper Airway Evaluation.     HPI: 44 YO F with h/o Psoriatic arthritis not meds, chronic dry eyes/mouth, Gastroparesis, IBS, GERD, h/o spontaneous PTX during Labor 13yrs ago, Endometriosis/fibroids s/p hysterectomy and Asthma since childhood presents with 3 months of asthma exacerbation acutely worsened in the past 3 weeks. As per pt, had asthma since childhood, got worse in the past 3 weeks. Pt noted to have increased SOB and cough in the past few weeks. Otherwise pt denies fever/ chills, dysphagia to santo and liquids, hoarsens sore throat. Pulmonary recommended ENT evaluation for the Upper airway.     PAST MEDICAL & SURGICAL HISTORY:  Plantar fasciitis, right  Overactive bladder  IBS (irritable bowel syndrome)  Gastroparesis  GERD without esophagitis  Pneumothorax on right:   Asthma  History of cyst of breast: Bilateral s/p removal  History of meniscectomy of right knee  History of appendectomy  H/O  section  S/P cholecystectomy  History of hysterectomy    Allergies.   clindamycin (Rash)  latex (Rash)  lidocaine (Stomach Upset; Vomiting; Nausea)    Intolerances.    MEDICATIONS  (STANDING):  ALBUTerol/ipratropium for Nebulization 3 milliLiter(s) Nebulizer every 4 hours  artificial  tears Solution 1 Drop(s) Both EYES three times a day  buDESOnide  80 MICROgram(s)/formoterol 4.5 MICROgram(s) Inhaler 2 Puff(s) Inhalation two times a day  enoxaparin Injectable 40 milliGRAM(s) SubCutaneous every 24 hours  erythromycin     base Tablet 500 milliGRAM(s) Oral two times a day  escitalopram 20 milliGRAM(s) Oral daily  famotidine    Tablet 20 milliGRAM(s) Oral daily  pantoprazole    Tablet 40 milliGRAM(s) Oral before breakfast  predniSONE   Tablet 40 milliGRAM(s) Oral daily    MEDICATIONS  (PRN):  acetaminophen   Tablet .. 650 milliGRAM(s) Oral every 6 hours PRN Mild Pain (1 - 3), Moderate Pain (4 - 6)  benzonatate 100 milliGRAM(s) Oral every 8 hours PRN Cough  guaiFENesin    Syrup 200 milliGRAM(s) Oral every 6 hours PRN Cough      Social History: No smoking history.     Family history: Family history of heart disease  Family history of hyperthyroidism  Family history of asthma in mother    ROS:   ENT: all negative except as noted in HPI   CV: denies palpitations  Pulm: c/o SOB, , cough.   GI: denies change in apetite, indigestion, n/v  : denies pertinent urinary symptoms, urgency  Neuro: denies numbness/tingling, loss of sensation  Psych: denies anxiety  MS: denies muscle weakness, instability  Heme: denies easy bruising or bleeding  Endo: denies heat/cold intolerance, excessive sweating  Vascular: denies LE edema    Vital Signs Last 24 Hrs  T(C): 37.3 (2019 13:45), Max: 37.3 (2019 13:45)  T(F): 99.1 (2019 13:45), Max: 99.1 (2019 13:45)  HR: 95 (2019 13:45) (88 - 103)  BP: 131/77 (2019 13:45) (111/74 - 137/82)  BP(mean): --  RR: 20 (2019 13:45) (20 - 26)  SpO2: 95% (2019 13:45) (95% - 99%)                          14.0   17.32 )-----------( 310      ( 2019 09:38 )             42.0    -    141  |  107  |  12  ----------------------------<  143<H>  4.3   |  19<L>  |  0.53    Ca    8.8      2019 06:31    TPro  7.9  /  Alb  4.7  /  TBili  1.0  /  DBili  x   /  AST  16  /  ALT  12  /  AlkPhos  105  04-24       PHYSICAL EXAM:  Gen: NAD.   Head: Normocephalic, Atraumatic.   Face: no edema, erythema, or fluctuance. Parotid glands soft without mass.   Eyes: no scleral injection.   Nose: Nares bilaterally patent, no discharge.   Mouth: No Stridor / Drooling / Trismus.  Mucosa moist, tongue/uvula midline, oropharynx clear.   Neck: Flat, supple, no lymphadenopathy, trachea midline, no masses.   Lymphatic: No lymphadenopathy.   Resp: Pt on 2L NC, no stridor.   CV: no peripheral edema/cyanosis.   GI: nondistended .   Peripheral vascular: no JVD or edema  Neuro: facial nerve intact, no facial droop    Reason for Laryngoscopy: Upper Airway Evaluation.   Mild Erythema at the b/l arytenoids. Nasopharynx, oropharynx, and hypopharynx clear, no bleeding. Tongue base, posterior pharyngeal wall, vallecula, epiglottis, and subglottis appear normal. No erythema, edema, pooling of secretions, masses or lesions. Airway patent, no foreign body visualized. No glottic/supraglottic edema. True vocal cords, vestibular folds, ventricles, pyriform sinuses, and aryepiglottic folds appear normal bilaterally. Vocal cords mobile with good contact b/l.

## 2019-04-26 ENCOUNTER — TRANSCRIPTION ENCOUNTER (OUTPATIENT)
Age: 44
End: 2019-04-26

## 2019-04-26 VITALS
SYSTOLIC BLOOD PRESSURE: 115 MMHG | RESPIRATION RATE: 18 BRPM | TEMPERATURE: 99 F | OXYGEN SATURATION: 96 % | HEART RATE: 73 BPM | DIASTOLIC BLOOD PRESSURE: 75 MMHG

## 2019-04-26 DIAGNOSIS — F41.9 ANXIETY DISORDER, UNSPECIFIED: ICD-10-CM

## 2019-04-26 LAB
ANTI-RIBONUCLEAR PROTEIN: <0.2 AI — SIGNIFICANT CHANGE UP
CORTIS AM PEAK SERPL-MCNC: 1.4 UG/DL — LOW (ref 6–18.4)
DSDNA AB FLD-ACNC: <0.2 AI — SIGNIFICANT CHANGE UP
ENA SM AB FLD QL: <0.2 AI — SIGNIFICANT CHANGE UP
ENA SS-A AB FLD IA-ACNC: <0.2 AI — SIGNIFICANT CHANGE UP
HCT VFR BLD CALC: 42 % — SIGNIFICANT CHANGE UP (ref 34.5–45)
HGB BLD-MCNC: 13.7 G/DL — SIGNIFICANT CHANGE UP (ref 11.5–15.5)
MCHC RBC-ENTMCNC: 30.5 PG — SIGNIFICANT CHANGE UP (ref 27–34)
MCHC RBC-ENTMCNC: 32.6 GM/DL — SIGNIFICANT CHANGE UP (ref 32–36)
MCV RBC AUTO: 93.5 FL — SIGNIFICANT CHANGE UP (ref 80–100)
PLATELET # BLD AUTO: 280 K/UL — SIGNIFICANT CHANGE UP (ref 150–400)
RBC # BLD: 4.49 M/UL — SIGNIFICANT CHANGE UP (ref 3.8–5.2)
RBC # FLD: 13.5 % — SIGNIFICANT CHANGE UP (ref 10.3–14.5)
TSH SERPL-MCNC: 0.29 UIU/ML — SIGNIFICANT CHANGE UP (ref 0.27–4.2)
WBC # BLD: 23.08 K/UL — HIGH (ref 3.8–10.5)
WBC # FLD AUTO: 23.08 K/UL — HIGH (ref 3.8–10.5)

## 2019-04-26 PROCEDURE — 87581 M.PNEUMON DNA AMP PROBE: CPT

## 2019-04-26 PROCEDURE — 85014 HEMATOCRIT: CPT

## 2019-04-26 PROCEDURE — 82803 BLOOD GASES ANY COMBINATION: CPT

## 2019-04-26 PROCEDURE — 84132 ASSAY OF SERUM POTASSIUM: CPT

## 2019-04-26 PROCEDURE — 86038 ANTINUCLEAR ANTIBODIES: CPT

## 2019-04-26 PROCEDURE — 82435 ASSAY OF BLOOD CHLORIDE: CPT

## 2019-04-26 PROCEDURE — 99233 SBSQ HOSP IP/OBS HIGH 50: CPT | Mod: GC

## 2019-04-26 PROCEDURE — 84145 PROCALCITONIN (PCT): CPT

## 2019-04-26 PROCEDURE — 71275 CT ANGIOGRAPHY CHEST: CPT

## 2019-04-26 PROCEDURE — 87633 RESP VIRUS 12-25 TARGETS: CPT

## 2019-04-26 PROCEDURE — 87486 CHLMYD PNEUM DNA AMP PROBE: CPT

## 2019-04-26 PROCEDURE — 99222 1ST HOSP IP/OBS MODERATE 55: CPT

## 2019-04-26 PROCEDURE — 85027 COMPLETE CBC AUTOMATED: CPT

## 2019-04-26 PROCEDURE — 83605 ASSAY OF LACTIC ACID: CPT

## 2019-04-26 PROCEDURE — 71045 X-RAY EXAM CHEST 1 VIEW: CPT

## 2019-04-26 PROCEDURE — 82533 TOTAL CORTISOL: CPT

## 2019-04-26 PROCEDURE — 83835 ASSAY OF METANEPHRINES: CPT

## 2019-04-26 PROCEDURE — 93005 ELECTROCARDIOGRAM TRACING: CPT

## 2019-04-26 PROCEDURE — 84484 ASSAY OF TROPONIN QUANT: CPT

## 2019-04-26 PROCEDURE — 87798 DETECT AGENT NOS DNA AMP: CPT

## 2019-04-26 PROCEDURE — 86235 NUCLEAR ANTIGEN ANTIBODY: CPT

## 2019-04-26 PROCEDURE — 93970 EXTREMITY STUDY: CPT

## 2019-04-26 PROCEDURE — 83880 ASSAY OF NATRIURETIC PEPTIDE: CPT

## 2019-04-26 PROCEDURE — 85379 FIBRIN DEGRADATION QUANT: CPT

## 2019-04-26 PROCEDURE — 99285 EMERGENCY DEPT VISIT HI MDM: CPT | Mod: 25

## 2019-04-26 PROCEDURE — 80048 BASIC METABOLIC PNL TOTAL CA: CPT

## 2019-04-26 PROCEDURE — 82947 ASSAY GLUCOSE BLOOD QUANT: CPT

## 2019-04-26 PROCEDURE — 84443 ASSAY THYROID STIM HORMONE: CPT

## 2019-04-26 PROCEDURE — 96374 THER/PROPH/DIAG INJ IV PUSH: CPT | Mod: XU

## 2019-04-26 PROCEDURE — 94640 AIRWAY INHALATION TREATMENT: CPT

## 2019-04-26 PROCEDURE — 96375 TX/PRO/DX INJ NEW DRUG ADDON: CPT | Mod: XU

## 2019-04-26 PROCEDURE — 80053 COMPREHEN METABOLIC PANEL: CPT

## 2019-04-26 PROCEDURE — 82330 ASSAY OF CALCIUM: CPT

## 2019-04-26 PROCEDURE — 84295 ASSAY OF SERUM SODIUM: CPT

## 2019-04-26 RX ORDER — ROBINUL 0.2 MG/ML
1 INJECTION INTRAMUSCULAR; INTRAVENOUS
Qty: 0 | Refills: 0 | COMMUNITY

## 2019-04-26 RX ORDER — ALBUTEROL 90 UG/1
2 AEROSOL, METERED ORAL
Qty: 0 | Refills: 0 | COMMUNITY

## 2019-04-26 RX ORDER — IPRATROPIUM/ALBUTEROL SULFATE 18-103MCG
3 AEROSOL WITH ADAPTER (GRAM) INHALATION
Qty: 0 | Refills: 0 | COMMUNITY
Start: 2019-04-26

## 2019-04-26 RX ORDER — IBUPROFEN 200 MG
400 TABLET ORAL ONCE
Qty: 0 | Refills: 0 | Status: COMPLETED | OUTPATIENT
Start: 2019-04-26 | End: 2019-04-26

## 2019-04-26 RX ORDER — FORMOTEROL FUMARATE 12 MCG
2 CAPSULE, WITH INHALATION DEVICE INHALATION
Qty: 0 | Refills: 0 | COMMUNITY

## 2019-04-26 RX ORDER — FAMOTIDINE 10 MG/ML
1 INJECTION INTRAVENOUS
Qty: 0 | Refills: 0 | COMMUNITY
Start: 2019-04-26

## 2019-04-26 RX ORDER — BUDESONIDE AND FORMOTEROL FUMARATE DIHYDRATE 160; 4.5 UG/1; UG/1
2 AEROSOL RESPIRATORY (INHALATION)
Qty: 0 | Refills: 0 | COMMUNITY
Start: 2019-04-26

## 2019-04-26 RX ORDER — ROFLUMILAST 500 UG/1
1 TABLET ORAL
Qty: 0 | Refills: 0 | COMMUNITY

## 2019-04-26 RX ORDER — PLECANATIDE 3 MG/1
1 TABLET ORAL
Qty: 0 | Refills: 0 | COMMUNITY

## 2019-04-26 RX ORDER — OMALIZUMAB 150 MG/ML
225 INJECTION, SOLUTION SUBCUTANEOUS
Qty: 0 | Refills: 0 | COMMUNITY

## 2019-04-26 RX ADMIN — FAMOTIDINE 20 MILLIGRAM(S): 10 INJECTION INTRAVENOUS at 11:08

## 2019-04-26 RX ADMIN — Medication 3 MILLILITER(S): at 04:25

## 2019-04-26 RX ADMIN — Medication 1 DROP(S): at 06:28

## 2019-04-26 RX ADMIN — ESCITALOPRAM OXALATE 20 MILLIGRAM(S): 10 TABLET, FILM COATED ORAL at 11:08

## 2019-04-26 RX ADMIN — Medication 400 MILLIGRAM(S): at 11:08

## 2019-04-26 RX ADMIN — Medication 100 MILLIGRAM(S): at 06:27

## 2019-04-26 RX ADMIN — Medication 3 MILLILITER(S): at 00:25

## 2019-04-26 RX ADMIN — Medication 500 MILLIGRAM(S): at 06:27

## 2019-04-26 RX ADMIN — Medication 1 DROP(S): at 13:50

## 2019-04-26 RX ADMIN — Medication 3 MILLILITER(S): at 11:09

## 2019-04-26 RX ADMIN — PANTOPRAZOLE SODIUM 40 MILLIGRAM(S): 20 TABLET, DELAYED RELEASE ORAL at 06:27

## 2019-04-26 RX ADMIN — BUDESONIDE AND FORMOTEROL FUMARATE DIHYDRATE 2 PUFF(S): 160; 4.5 AEROSOL RESPIRATORY (INHALATION) at 06:27

## 2019-04-26 RX ADMIN — Medication 400 MILLIGRAM(S): at 12:08

## 2019-04-26 RX ADMIN — Medication 40 MILLIGRAM(S): at 06:27

## 2019-04-26 NOTE — BEHAVIORAL HEALTH ASSESSMENT NOTE - HPI (INCLUDE ILLNESS QUALITY, SEVERITY, DURATION, TIMING, CONTEXT, MODIFYING FACTORS, ASSOCIATED SIGNS AND SYMPTOMS)
Pt is a 44 y/o   woman, domiciled in an apartment with her  and children, with psychiatric history of anxiety, taking Lexapro 20mg 0.5 tablets every 2-3 days, never seen a psychiatrist, no history of psychiatric hospitalization, psychiatric family history, or SIB/SA, medical history of asthma, environmental allergies, gastroparesis, GERD, IBS, overactive bladder, admitted to the hospital for asthma exacerbation. Psychiatry consulted for evaluation of anxiety.    Patient has a history of mild anxiety her entire life. She says she feels anxious when she takes timed exams, is in large public areas, and when testifying in court for her job as a . She had worsening of her anxiety 2 years ago when she had increased stressors at work. She works as a  for the medical [employer]. She discovered records that she was receiving less pay for overtime than her male colleagues, prompting her to confront her boss and bring the issue to the union. This led to her being humiliated, ignored, and bullied at work. This caused her significant distress going from one of the top performers at her job to being treated poorly and passed over for promotions. She coped with this stress on her own until 1 year ago when her PCP started prescribing her Lexapro 20mg daily for her anxiety. She felt too tired with this dose and started taking half a tablet every 2-3 days. She feels an increase self-confidence with the medication but doesn’t think that she needs to take it everyday. She has occasional insomnia due to thinking about upcoming projects at work. She denies recent increases in her anxiety prior to or during hospitalization. She believes her asthma exacerbation is due to her allergies, unrelated to her anxiety. She denies past or present panic attacks, depressed mood, guilt, hopelessness, anhedonia, maryan, visual/auditory hallucinations, SI/HI.

## 2019-04-26 NOTE — BEHAVIORAL HEALTH ASSESSMENT NOTE - NSBHCHARTREVIEWIMAGING_PSY_A_CORE FT
< from: [facility] Duplex Lower Ext Vein Scan, Bilat (04.25.19 @ 15:34) >      IMPRESSION:     No evidence of bilateral lower extremity deep venous thrombosis.    < end of copied text >      IMPRESSION:   No evidence of pulmonary embolus, as clinically questioned.    < from: Xray Chest 1 View AP/PA (04.24.19 @ 16:56) >    IMPRESSION:   Clear lungs    < end of copied text >

## 2019-04-26 NOTE — BEHAVIORAL HEALTH ASSESSMENT NOTE - SUMMARY
Pt is a 44 y/o   woman, domiciled in an apartment with her  and children, with psychiatric history of anxiety, taking Lexapro 20mg 0.5 tablets every 2-3 days, never seen a psychiatrist, no history of psychiatric hospitalization, psychiatric family history, or SIB/SA, medical history of asthma, environmental allergies, gastroparesis, GERD, IBS, overactive bladder, admitted to the hospital for asthma exacerbation. Psychiatry consulted for evaluation of anxiety.    Patient has a lifelong history of mild anxiety, taking Lexapro every few days for the last year. She is at her baseline with no recent increased anxiety, insomnia, depression, guilt, hopelessness, anhedonia, maryan, visual/auditory hallucinations, SI/HI. pt is on lexapro, however doesn't take it everyday, takes between 5-10mg.

## 2019-04-26 NOTE — BEHAVIORAL HEALTH ASSESSMENT NOTE - NSBHMEDSOTHERFT_PSY_A_CORE
Lexapro 20mg po daily  Daliresp 250 mcg po daily  Lonhala magnair starter 25mcg inhaler BID  Proair 90mcg inhaler PRN  Albuterol nebulizer PRN  Xolair 225mg sub-q [id number]

## 2019-04-26 NOTE — BEHAVIORAL HEALTH ASSESSMENT NOTE - NSBHCHARTREVIEWINVESTIGATE_PSY_A_CORE FT
< from: 12 Lead ECG (04.24.19 @ 15:07) >      Ventricular Rate 90 BPM    Atrial Rate 90 BPM    P-R Interval 132 ms    QRS Duration 80 ms    Q-T Interval 362 ms    QTC Calculation(Bezet) 442 ms    P Axis 64 degrees    R Axis 25 degrees    T Axis 54 degrees    Diagnosis Line NORMAL SINUS RHYTHM WITH SINUS ARRHYTHMIA  NORMAL ECG    < end of copied text >

## 2019-04-26 NOTE — DISCHARGE NOTE PROVIDER - HOSPITAL COURSE
This is a 44yo F with h/o Psoriatic arthritis not meds, chronic dry eyes/mouth, Gastroparesis, IBS, GERD, h/o spontaneous PTX during Labor 13yrs ago, Endometriosis/fibroids s/p hysterectomy and Asthma since childhood who presents on 4/24 with 3 months of asthma exacerbation which as per pt. acutely worsened in the past 3 weeks before admission.    Patient describes episodes of dyspnea, chest tightness/pressure, heart palpitations and feelings of gasping with lack of air usually associated with anything that increases her heart rate. This is a 42yo F with h/o Psoriatic arthritis not on meds, chronic dry eyes/mouth, gastroparesis, IBS, GERD, h/o spontaneous PTX during Labor 13yrs ago, Endometriosis/fibroids s/p hysterectomy and Asthma since childhood who presents on 4/24 with 3 months of asthma exacerbation which as per pt. acutely worsened in the past 3 weeks before admission. Patient additionally describes episodes of dyspnea, chest tightness/pressure, heart palpitations and feelings of gasping with lack of air usually associated with anything that increases her heart rate. During the in-pt admission this pt. was seen by pulmonary and ENT with improved symptoms and received Symbicort and after a brief course of IV steroids was transitioned to oral Prednisone with plans to taper as prescribed. Examined by ENT and can additionally follow up as needed. Pt. Had PFT's which improved after retesting from time of admission. As per pulmonary feeling better on oxygen, Symbicort, Prednisone and Duonebs, prednisone taper by 10mg q4 days until finished, discharged only on Symbicort and Prednisone taper. Patient will continue with rescue inhaler at home, will follow result of endocrine and autoimmune workup as outpatient and she has an appointment on 5/10/2019 at [address] [place] [address] with Dr. Velez. Discussed dischrge plan with Dr. Medina and pt. discharged as above.

## 2019-04-26 NOTE — PROGRESS NOTE ADULT - ASSESSMENT
Problem/Plan - 1:  ·  Problem: Exacerbation of asthma, unspecified asthma severity, unspecified whether persistent.  Plan: -steroids as per pulmonary  feeling better  pulmonary fu appreciated  Duonebs Q4H.   psych consult for anxiety     Problem/Plan - 2:  ·  Problem: Gastroparesis.  Plan: -C/w home erythromycin for gastroparesis, which patient says has been helping her.     Problem/Plan - 3:  ·  Problem: Irritable bowel syndrome, unspecified type.  Plan: -Home med of Trulance not on formulary here, patient can likely take if she has her own supply.  -C/w home Lexapro.     Problem/Plan - 4:  ·  Problem: Leukocytosis, unspecified type.  Plan: -Likely due to steroids patient received and was on at home.   -Monitor off of abx for now.  -Monitor CBC.  .
43F with diagnosis of Asthma and obstructive lung disease on several long acting anticholinergics/muscarinics and KENNETH with recurrent tachycardia, heart palpitations, difficulty breathing and dyspnea associated with anxiety or exertion with no evidence of cardiomyopathy, parenchymal lung inflammation, chest lympadenopathy or disease but with reported obstructive lung disease on inhaled/oral steroids without wheezing. Differential diagnosis includes anxiety, endocrine pathology, arrythmia, bronchial asthma.    - Patient feeling better on oxygen, Symbicort, Prednisone and Duonebs.  - Peak flow improved today.  - Please taper prednisone by 10mg q4 days until finished.  - Please discharge only on Symbicort and Prednisone taper. Patient has rescue inhaler at home.  - Will follow result of endocrine and autoimmune workup as outpatient.  - She has an appointment on 5/10/2019 at [address] [place] [address] with Dr. Velez at 10:00 am.  - Discussed with patient and team.  - Please call back with questions.

## 2019-04-26 NOTE — BEHAVIORAL HEALTH ASSESSMENT NOTE - NSBHSUICPROTECTFACT_PSY_A_CORE
Responsibility to family and others/Positive therapeutic relationships/Identifies reasons for living/Future oriented/Supportive social network or family/High spirituality/Engaged in work or school/Ability to cope with stress

## 2019-04-26 NOTE — DISCHARGE NOTE PROVIDER - CARE PROVIDER_API CALL
Kurt Velez)  Critical Care Medicine; Internal Medicine; Pulmonary Disease  [address] [id number]  Phone: [phone]  Fax: [phone]  Follow Up Time: Kurt Velez)  Critical Care Medicine; Internal Medicine; Pulmonary Disease  [address] [id number]  Phone: [phone]  Fax: [phone]  Follow Up Time:

## 2019-04-26 NOTE — BEHAVIORAL HEALTH ASSESSMENT NOTE - DESCRIPTION
asthma, environmental allergies, gastroparesis, GERD, IBS, overactive bladder. Surgical history of appendectomy, breast cyst removal, hysterectomy, cholecystectomy

## 2019-04-26 NOTE — DISCHARGE NOTE PROVIDER - NSDCCPCAREPLAN_GEN_ALL_CORE_FT
PRINCIPAL DISCHARGE DIAGNOSIS  Diagnosis: Asthma exacerbation  Assessment and Plan of Treatment: IV steroids and transitioned to oral prednisone  Seen and follwed by pulmonary   Duonebs  / Symbicort with imrpoving symptoms   Followup as directed  - appt. on 5/10/19      SECONDARY DISCHARGE DIAGNOSES  Diagnosis: Anxiety  Assessment and Plan of Treatment: Seen by psych  Continue with Lexapro    Diagnosis: GERD (gastroesophageal reflux disease)  Assessment and Plan of Treatment: GERD (gastroesophageal reflux disease)  Pt. will resume home meds as needed   Followup with GI MD re IBS

## 2019-04-26 NOTE — BEHAVIORAL HEALTH ASSESSMENT NOTE - NSBHCONSULTRECOMMENDOTHER_PSY_A_CORE FT
Follow up with [facility] outpatient for therapy. Phone number given to patient.  Start taking Lexapro 5mg PO daily. Emphasized the importance of taking daily.

## 2019-04-26 NOTE — BEHAVIORAL HEALTH ASSESSMENT NOTE - RISK ASSESSMENT
Risk factors include history of anxiety and work stressors. Protective factors include supportive family, therapeutic relationships, future oriented, with Mormonism beliefs and no history of psychiatric hospitalization, SIB/SA, SI/HI, legal issues, visual/auditory hallucinations or access to firearms. Patient at overall low risk for harm to self or others.

## 2019-04-26 NOTE — PROGRESS NOTE ADULT - ATTENDING COMMENTS
Patient seen and examined. Laboratory data and imaging reviewed. Patient is feeling slightly improved today. She was seen by ENT yesterday with evidence of GERD but normal vocal cord function. She was seen by psychiatry this morning. She has a pulmonary appointment scheduled with Dr. Kurt Velez at the [facility] on 5/10/19 for further workup and evaluation. Please taper her steroids as noted above.

## 2019-04-26 NOTE — PROGRESS NOTE ADULT - SUBJECTIVE AND OBJECTIVE BOX
CHIEF COMPLAINT: dyspnea    Interval Events: none    REVIEW OF SYSTEMS:  Constitutional: [ x] negative [ ] fevers [ ] chills [ ] weight loss [ ] weight gain  HEENT: [x ] negative [ ] dry eyes [ ] eye irritation [ ] postnasal drip [ ] nasal congestion  CV: [x ] negative  [ ] chest pain [ ] orthopnea [ ] palpitations [ ] murmur  Resp: [ ] negative [ ] cough [ ] shortness of breath [x ] dyspnea [ ] wheezing [ ] sputum [ ] hemoptysis  GI: [ x] negative [ ] nausea [ ] vomiting [ ] diarrhea [ ] constipation [ ] abd pain [ ] dysphagia   : [ x] negative [ ] dysuria [ ] nocturia [ ] hematuria [ ] increased urinary frequency  Musculoskeletal: [x ] negative [ ] back pain [ ] myalgias [ ] arthralgias [ ] fracture  Skin: [x ] negative [ ] rash [ ] itch  Neurological: [x ] negative [ ] headache [ ] dizziness [ ] syncope [ ] weakness [ ] numbness  Psychiatric: [ ] negative [ x] anxiety [ ] depression  Endocrine: [ x] negative [ ] diabetes [ ] thyroid problem  Hematologic/Lymphatic: [x ] negative [ ] anemia [ ] bleeding problem  Allergic/Immunologic: [ ] negative [ ] itchy eyes [ ] nasal discharge [ ] hives [ ] angioedema  [x ] All other systems negative  [ ] Unable to assess ROS because ________    OBJECTIVE:  T(C): 36.6 (26 Apr 2019 06:08), Max: 37.3 (25 Apr 2019 13:45)  T(F): 97.9 (26 Apr 2019 06:08), Max: 99.1 (25 Apr 2019 13:45)  HR: 82 (26 Apr 2019 06:08) (82 - 95)  BP: 123/77 (26 Apr 2019 06:08) (119/72 - 131/77)  RR: 18 (26 Apr 2019 06:08) (18 - 20)  SpO2: 98% (26 Apr 2019 06:08) (95% - 98%)        04-25 @ 07:01  -  04-26 @ 07:00  --------------------------------------------------------  IN: 1400 mL / OUT: 0 mL / NET: 1400 mL      CAPILLARY BLOOD GLUCOSE          PHYSICAL EXAM:  General: NAD  HEENT: MONIQUE  Lymph Nodes:  Neck: No JVD  Respiratory: CTA b/l  Cardiovascular: RRR no m/r/g  Abdomen: S/NT/ND  Extremities: -C/C/E  Skin: NO RASH  Neurological: non-focal.  Psychiatry:    HOSPITAL MEDICATIONS:  enoxaparin Injectable 40 milliGRAM(s) SubCutaneous every 24 hours    erythromycin     base Tablet 500 milliGRAM(s) Oral two times a day      predniSONE   Tablet 40 milliGRAM(s) Oral daily    ALBUTerol/ipratropium for Nebulization 3 milliLiter(s) Nebulizer every 4 hours  benzonatate 100 milliGRAM(s) Oral every 8 hours PRN  buDESOnide  80 MICROgram(s)/formoterol 4.5 MICROgram(s) Inhaler 2 Puff(s) Inhalation two times a day  guaiFENesin    Syrup 200 milliGRAM(s) Oral every 6 hours PRN    acetaminophen   Tablet .. 650 milliGRAM(s) Oral every 6 hours PRN  escitalopram 20 milliGRAM(s) Oral daily    famotidine    Tablet 20 milliGRAM(s) Oral daily  pantoprazole    Tablet 40 milliGRAM(s) Oral before breakfast            artificial  tears Solution 1 Drop(s) Both EYES three times a day        LABS:                        13.7   23.08 )-----------( 280      ( 26 Apr 2019 07:44 )             42.0     Hgb Trend: 13.7<--, 14.0<--, 15.8<--  04-25    141  |  107  |  12  ----------------------------<  143<H>  4.3   |  19<L>  |  0.53    Ca    8.8      25 Apr 2019 06:31    TPro  7.9  /  Alb  4.7  /  TBili  1.0  /  DBili  x   /  AST  16  /  ALT  12  /  AlkPhos  105  04-24    Creatinine Trend: 0.53<--, 0.68<--        Venous Blood Gas:  04-24 @ 15:38  7.43/40/34/26/63  VBG Lactate: 2.8      MICROBIOLOGY:     RADIOLOGY:  [ ] Reviewed and interpreted by me    PULMONARY FUNCTION TESTS:    EKG: CHIEF COMPLAINT: dyspnea    Interval Events: none    REVIEW OF SYSTEMS:  Constitutional: [ x] negative [ ] fevers [ ] chills [ ] weight loss [ ] weight gain  HEENT: [x ] negative [ ] dry eyes [ ] eye irritation [ ] postnasal drip [ ] nasal congestion  CV: [x ] negative  [ ] chest pain [ ] orthopnea [ ] palpitations [ ] murmur  Resp: [ ] negative [ ] cough [ ] shortness of breath [x ] dyspnea [ ] wheezing [ ] sputum [ ] hemoptysis  GI: [ x] negative [ ] nausea [ ] vomiting [ ] diarrhea [ ] constipation [ ] abd pain [ ] dysphagia   : [ x] negative [ ] dysuria [ ] nocturia [ ] hematuria [ ] increased urinary frequency  Musculoskeletal: [x ] negative [ ] back pain [ ] myalgias [ ] arthralgias [ ] fracture  Skin: [x ] negative [ ] rash [ ] itch  Neurological: [x ] negative [ ] headache [ ] dizziness [ ] syncope [ ] weakness [ ] numbness  Psychiatric: [ ] negative [ x] anxiety [ ] depression  Endocrine: [ x] negative [ ] diabetes [ ] thyroid problem  Hematologic/Lymphatic: [x ] negative [ ] anemia [ ] bleeding problem  Allergic/Immunologic: [ ] negative [ ] itchy eyes [ ] nasal discharge [ ] hives [ ] angioedema  [x ] All other systems negative  [ ] Unable to assess ROS because ________    OBJECTIVE:  T(C): 36.6 (26 Apr 2019 06:08), Max: 37.3 (25 Apr 2019 13:45)  T(F): 97.9 (26 Apr 2019 06:08), Max: 99.1 (25 Apr 2019 13:45)  HR: 82 (26 Apr 2019 06:08) (82 - 95)  BP: 123/77 (26 Apr 2019 06:08) (119/72 - 131/77)  RR: 18 (26 Apr 2019 06:08) (18 - 20)  SpO2: 98% (26 Apr 2019 06:08) (95% - 98%)        04-25 @ 07:01  -  04-26 @ 07:00  --------------------------------------------------------  IN: 1400 mL / OUT: 0 mL / NET: 1400 mL      CAPILLARY BLOOD GLUCOSE          PHYSICAL EXAM:  General: NAD  HEENT: MONIQUE  Lymph Nodes:  Neck: No JVD  Respiratory: CTA b/l  Cardiovascular: RRR no m/r/g  Abdomen: S/NT/ND  Extremities: -C/C/E  Skin: NO RASH  Neurological: non-focal.  Psychiatry: [facility] MEDICATIONS:  enoxaparin Injectable 40 milliGRAM(s) SubCutaneous every 24 hours    erythromycin     base Tablet 500 milliGRAM(s) Oral two times a day      predniSONE   Tablet 40 milliGRAM(s) Oral daily    ALBUTerol/ipratropium for Nebulization 3 milliLiter(s) Nebulizer every 4 hours  benzonatate 100 milliGRAM(s) Oral every 8 hours PRN  buDESOnide  80 MICROgram(s)/formoterol 4.5 MICROgram(s) Inhaler 2 Puff(s) Inhalation two times a day  guaiFENesin    Syrup 200 milliGRAM(s) Oral every 6 hours PRN    acetaminophen   Tablet .. 650 milliGRAM(s) Oral every 6 hours PRN  escitalopram 20 milliGRAM(s) Oral daily    famotidine    Tablet 20 milliGRAM(s) Oral daily  pantoprazole    Tablet 40 milliGRAM(s) Oral before breakfast            artificial  tears Solution 1 Drop(s) Both EYES three times a day        LABS:                        13.7   23.08 )-----------( 280      ( 26 Apr 2019 07:44 )             42.0     Hgb Trend: 13.7<--, 14.0<--, 15.8<--  04-25    141  |  107  |  12  ----------------------------<  143<H>  4.3   |  19<L>  |  0.53    Ca    8.8      25 Apr 2019 06:31    TPro  7.9  /  Alb  4.7  /  TBili  1.0  /  DBili  x   /  AST  16  /  ALT  12  /  AlkPhos  105  04-24    Creatinine Trend: 0.53<--, 0.68<--        Venous Blood Gas:  04-24 @ 15:38  7.43/40/34/26/63  VBG Lactate: 2.8      MICROBIOLOGY:     RADIOLOGY:  [ ] Reviewed and interpreted by me    PULMONARY FUNCTION TESTS:    EKG:

## 2019-04-26 NOTE — BEHAVIORAL HEALTH ASSESSMENT NOTE - NSBHCHARTREVIEWLAB_PSY_A_CORE FT
13.7   23.08 )-----------( 280      ( 26 Apr 2019 07:44 )             42.0     04-25    141  |  107  |  12  ----------------------------<  143<H>  4.3   |  19<L>  |  0.53    Ca    8.8      25 Apr 2019 06:31    TPro  7.9  /  Alb  4.7  /  TBili  1.0  /  DBili  x   /  AST  16  /  ALT  12  /  AlkPhos  105  04-24

## 2019-04-26 NOTE — BEHAVIORAL HEALTH ASSESSMENT NOTE - NSBHCHARTREVIEWVS_PSY_A_CORE FT
Vital Signs Last 24 Hrs  T(C): 36.6 (26 Apr 2019 06:08), Max: 37.3 (25 Apr 2019 13:45)  T(F): 97.9 (26 Apr 2019 06:08), Max: 99.1 (25 Apr 2019 13:45)  HR: 82 (26 Apr 2019 06:08) (82 - 95)  BP: 123/77 (26 Apr 2019 06:08) (119/72 - 131/77)  BP(mean): --  RR: 18 (26 Apr 2019 06:08) (18 - 20)  SpO2: 98% (26 Apr 2019 06:08) (95% - 98%)

## 2019-04-26 NOTE — CONSULT NOTE ADULT - SUBJECTIVE AND OBJECTIVE BOX
43F with diagnosis of Asthma and obstructive lung disease on several long acting anticholinergics/muscarinics and KENNETH with recurrent tachycardia, heart palpitations, difficulty breathing and dyspnea associated with anxiety or exertion with no evidence of cardiomyopathy, parenchymal lung inflammation, chest lympadenopathy or disease but with reported obstructive lung disease on inhaled/oral steroids without wheezing. Differential diagnosis includes anxiety, endocrine pathology, bronchial asthma.    - Patient feeling better on oxygen, Symbicort, Prednisone and Duonebs.  - Peak flow improved today.  - Please taper prednisone by 10mg q4 days until finished.  - Please discharge only on Symbicort and Prednisone taper. Patient has rescue inhaler at home.  - Will follow result of endocrine and autoimmune workup as outpatient.  - She has an appointment on 5/10/2019 at [address] [place] [address] with Dr. Velez at 10:00 am.  - Discussed with patient and team.  - Please call back with questions.

## 2019-04-26 NOTE — DISCHARGE NOTE NURSING/CASE MANAGEMENT/SOCIAL WORK - NSDCDPATPORTLINK_GEN_ALL_CORE
You can access the [facility] Patient Portal, offered by [facility], by registering with the following website: [email or web address]

## 2019-04-26 NOTE — DISCHARGE NOTE PROVIDER - CARE PROVIDERS DIRECT ADDRESSES
[email or web address]

## 2019-04-29 LAB
ANA TITR SER: NEGATIVE — SIGNIFICANT CHANGE UP
METANEPHRINE, PL: 17 PG/ML — SIGNIFICANT CHANGE UP (ref 0–62)
NORMETANEPHRINE, PL: 12 PG/ML — SIGNIFICANT CHANGE UP (ref 0–145)

## 2019-05-10 ENCOUNTER — APPOINTMENT (OUTPATIENT)
Dept: PULMONOLOGY | Facility: CLINIC | Age: 44
End: 2019-05-10
Payer: COMMERCIAL

## 2019-05-10 VITALS
DIASTOLIC BLOOD PRESSURE: 74 MMHG | SYSTOLIC BLOOD PRESSURE: 116 MMHG | HEIGHT: 60 IN | OXYGEN SATURATION: 97 % | BODY MASS INDEX: 30.43 KG/M2 | TEMPERATURE: 97.9 F | WEIGHT: 155 LBS | RESPIRATION RATE: 15 BRPM | HEART RATE: 83 BPM

## 2019-05-10 VITALS — HEIGHT: 59 IN | WEIGHT: 151 LBS | BODY MASS INDEX: 30.44 KG/M2

## 2019-05-10 DIAGNOSIS — J45.909 UNSPECIFIED ASTHMA, UNCOMPLICATED: ICD-10-CM

## 2019-05-10 DIAGNOSIS — R06.02 SHORTNESS OF BREATH: ICD-10-CM

## 2019-05-10 DIAGNOSIS — G47.33 OBSTRUCTIVE SLEEP APNEA (ADULT) (PEDIATRIC): ICD-10-CM

## 2019-05-10 DIAGNOSIS — K21.9 GASTRO-ESOPHAGEAL REFLUX DISEASE W/OUT ESOPHAGITIS: ICD-10-CM

## 2019-05-10 PROCEDURE — 94729 DIFFUSING CAPACITY: CPT

## 2019-05-10 PROCEDURE — 99214 OFFICE O/P EST MOD 30 MIN: CPT | Mod: 25

## 2019-05-10 PROCEDURE — 94060 EVALUATION OF WHEEZING: CPT

## 2019-05-10 PROCEDURE — ZZZZZ: CPT

## 2019-05-10 PROCEDURE — 94726 PLETHYSMOGRAPHY LUNG VOLUMES: CPT

## 2019-05-10 RX ORDER — FLUTICASONE FUROATE AND VILANTEROL TRIFENATATE 200; 25 UG/1; UG/1
200-25 POWDER RESPIRATORY (INHALATION) DAILY
Qty: 1 | Refills: 5 | Status: ACTIVE | COMMUNITY
Start: 2019-05-10 | End: 1900-01-01

## 2019-05-10 NOTE — CONSULT LETTER
[Dear  ___] : Dear  [unfilled], [Please see my note below.] : Please see my note below. [Consult Letter:] : I had the pleasure of evaluating your patient, [unfilled]. [Sincerely,] : Sincerely, [Consult Closing:] : Thank you very much for allowing me to participate in the care of this patient.  If you have any questions, please do not hesitate to contact me. [FreeTextEntry3] : Kurt Velez MD [FreeTextEntry2] : Dr. Manuel Miller\par [address], [place] [address]\par [phone]

## 2019-05-10 NOTE — HISTORY OF PRESENT ILLNESS
[FreeTextEntry1] : Patient with extensive history of asthma presents for initial evaluation after being admitted to [facility] from 4/24-4/26 for asthma exacerbation.\par \par Her initial symptoms presented as a child when exercising, chest tightness only. rest/water relieved. As time went on, she began to notice SOB/chest tightness when very hot or cold while growing up in [place]. When she was about 19, she emigrated to [place] and in her 20s, she was Dx with asthma following an acute exacerbation that was managed in office with nebulizer treatments and steroids, but she was not put on maintenance medication at that time.\par \par She noticed that her symptoms worsened when she first was pregnant; pft showed mild asthma at that time. During L&D, she devloped R lung atelectasis. Since that pregnancy, she noticed her asthma symptoms worsened afterwards. Symptoms now expanded to cough, wheeze, dyspnea, chest tightness.\par \par She was seen at [facility] 8 years ago with chest pain/tightness to r/o PE, with a questionable interpretation of pulmonary angiogram suggesting she had a PE, but later read as normal. She was discharged on no AC.\par \par In recent years her asthma has been managed by pulmonologist, Dr. Miller, adding maintenance therapies as time went on which now includes Symbicort and Xolair. The day she presented to [facility] in April, she was at work, she felt chest tightness used nebulizer and rescue inhaler. Went to [facility], put on O2 immediately. Treated with nebulizers, steroids, Mg with resolution of symptoms.\par \par Since hospitalization, she has been off all therapy but feels the same as she did when on therapy. She still uses her rescue inhaler 3 times daily.\par \par She notes mild gastroparesis, significant GERD. She states that she is aware she has anxiety, but has never sought treatment.\par \par Dr. Miller recently sent her for PSG and she was diagnosed with NASH, for which she uses CPAP. However, she feels it does not help her as she feels like she is suffocating when using her mask.\par \par PFT today shows moderate obstruction with an insignificant bronchodilator response.

## 2019-05-10 NOTE — PHYSICAL EXAM
[General Appearance - Well Developed] : well developed [Normal Appearance] : normal appearance [Well Groomed] : well groomed [General Appearance - Well Nourished] : well nourished [No Deformities] : no deformities [General Appearance - In No Acute Distress] : no acute distress [Normal Conjunctiva] : the conjunctiva exhibited no abnormalities [Eyelids - No Xanthelasma] : the eyelids demonstrated no xanthelasmas [Normal Oropharynx] : normal oropharynx [Neck Appearance] : the appearance of the neck was normal [Neck Cervical Mass (___cm)] : no neck mass was observed [Thyroid Diffuse Enlargement] : the thyroid was not enlarged [Jugular Venous Distention Increased] : there was no jugular-venous distention [Heart Rate And Rhythm] : heart rate and rhythm were normal [Thyroid Nodule] : there were no palpable thyroid nodules [Murmurs] : no murmurs present [Heart Sounds] : normal S1 and S2 [Exaggerated Use Of Accessory Muscles For Inspiration] : no accessory muscle use [Respiration, Rhythm And Depth] : normal respiratory rhythm and effort [Auscultation Breath Sounds / Voice Sounds] : lungs were clear to auscultation bilaterally [Abdomen Soft] : soft [Abdomen Tenderness] : non-tender [Abdomen Mass (___ Cm)] : no abdominal mass palpated [Abnormal Walk] : normal gait [Nail Clubbing] : no clubbing of the fingernails [Gait - Sufficient For Exercise Testing] : the gait was sufficient for exercise testing [Cyanosis, Localized] : no localized cyanosis [Petechial Hemorrhages (___cm)] : no petechial hemorrhages [Skin Turgor] : normal skin turgor [Skin Color & Pigmentation] : normal skin color and pigmentation [] : no rash [Deep Tendon Reflexes (DTR)] : deep tendon reflexes were 2+ and symmetric [Sensation] : the sensory exam was normal to light touch and pinprick [No Focal Deficits] : no focal deficits [Oriented To Time, Place, And Person] : oriented to person, place, and time [Affect] : the affect was normal [Impaired Insight] : insight and judgment were intact

## 2019-05-10 NOTE — REASON FOR VISIT
[Initial Eval - Existing Diagnosis] : an initial evaluation of an existing diagnosis [Asthma] : asthma

## 2019-05-10 NOTE — REVIEW OF SYSTEMS
[Nasal Congestion] : nasal congestion [Postnasal Drip] : postnasal drip [Cough] : cough [Dyspnea] : dyspnea [Chest Tightness] : chest tightness [Heartburn] : heartburn [Anxiety] : anxiety [As Noted in HPI] : as noted in HPI [Negative] : Endocrine

## 2019-05-10 NOTE — ASSESSMENT
[FreeTextEntry1] : Based upon PFT, history and symptoms, asthma diagnosis is confirmed, moderate in nature. Suggest Breo as controller medication, provided patient with sample and ordered. Based on history there is suspicion for anxiety exacerbating her symptoms and causing an unwarranted escalation in therapy and it is unlikely she is benefitting from xolair therapy given that exacerbations are still occurring with the same frequency while on xolair. Suggest LABA/ICS as only maintenance therapy and that patient seek treatment for anxiety. Patient referred to [facility] CBT specialists, and after conversation she expresses a willingness to try it. Advised that she may return to management by Dr. Miller, and/or follow up with this office if she would like.

## 2019-05-10 NOTE — END OF VISIT
[FreeTextEntry3] : I directly supervised nurse practitioner Phillip Santillan and was present during key points of his history and physical. I agree with his history, physical and assessment.\par

## 2019-05-12 PROBLEM — N32.81 OVERACTIVE BLADDER: Chronic | Status: ACTIVE | Noted: 2019-04-24

## 2019-05-12 PROBLEM — J93.9 PNEUMOTHORAX, UNSPECIFIED: Chronic | Status: ACTIVE | Noted: 2019-04-24

## 2019-05-12 PROBLEM — J45.909 UNSPECIFIED ASTHMA, UNCOMPLICATED: Chronic | Status: ACTIVE | Noted: 2019-04-24

## 2019-05-12 PROBLEM — K31.84 GASTROPARESIS: Chronic | Status: ACTIVE | Noted: 2019-04-24

## 2019-05-12 PROBLEM — M72.2 PLANTAR FASCIAL FIBROMATOSIS: Chronic | Status: ACTIVE | Noted: 2019-04-24

## 2019-05-12 PROBLEM — K58.9 IRRITABLE BOWEL SYNDROME WITHOUT DIARRHEA: Chronic | Status: ACTIVE | Noted: 2019-04-24

## 2019-05-12 PROBLEM — K21.9 GASTRO-ESOPHAGEAL REFLUX DISEASE WITHOUT ESOPHAGITIS: Chronic | Status: ACTIVE | Noted: 2019-04-24

## 2019-07-16 ENCOUNTER — APPOINTMENT (OUTPATIENT)
Dept: UROLOGY | Facility: CLINIC | Age: 44
End: 2019-07-16

## 2019-09-17 ENCOUNTER — APPOINTMENT (OUTPATIENT)
Dept: UROLOGY | Facility: CLINIC | Age: 44
End: 2019-09-17

## 2019-10-20 ENCOUNTER — OUTPATIENT (OUTPATIENT)
Dept: OUTPATIENT SERVICES | Facility: HOSPITAL | Age: 44
LOS: 1 days | End: 2019-10-20
Payer: COMMERCIAL

## 2019-10-20 ENCOUNTER — APPOINTMENT (OUTPATIENT)
Dept: MRI IMAGING | Facility: HOSPITAL | Age: 44
End: 2019-10-20

## 2019-10-20 DIAGNOSIS — Z87.2 PERSONAL HISTORY OF DISEASES OF THE SKIN AND SUBCUTANEOUS TISSUE: Chronic | ICD-10-CM

## 2019-10-20 DIAGNOSIS — Z98.891 HISTORY OF UTERINE SCAR FROM PREVIOUS SURGERY: Chronic | ICD-10-CM

## 2019-10-20 DIAGNOSIS — Z90.49 ACQUIRED ABSENCE OF OTHER SPECIFIED PARTS OF DIGESTIVE TRACT: Chronic | ICD-10-CM

## 2019-10-20 DIAGNOSIS — Z98.890 OTHER SPECIFIED POSTPROCEDURAL STATES: Chronic | ICD-10-CM

## 2019-10-20 DIAGNOSIS — Z90.710 ACQUIRED ABSENCE OF BOTH CERVIX AND UTERUS: Chronic | ICD-10-CM

## 2019-10-20 PROCEDURE — 70553 MRI BRAIN STEM W/O & W/DYE: CPT

## 2019-10-20 PROCEDURE — 70553 MRI BRAIN STEM W/O & W/DYE: CPT | Mod: 26

## 2019-10-20 PROCEDURE — 72156 MRI NECK SPINE W/O & W/DYE: CPT | Mod: 26

## 2019-10-20 PROCEDURE — 72156 MRI NECK SPINE W/O & W/DYE: CPT

## 2019-10-20 PROCEDURE — A9585: CPT

## 2019-11-17 ENCOUNTER — APPOINTMENT (OUTPATIENT)
Dept: MRI IMAGING | Facility: HOSPITAL | Age: 44
End: 2019-11-17

## 2019-11-17 ENCOUNTER — OUTPATIENT (OUTPATIENT)
Dept: OUTPATIENT SERVICES | Facility: HOSPITAL | Age: 44
LOS: 1 days | End: 2019-11-17
Payer: COMMERCIAL

## 2019-11-17 DIAGNOSIS — Z90.49 ACQUIRED ABSENCE OF OTHER SPECIFIED PARTS OF DIGESTIVE TRACT: Chronic | ICD-10-CM

## 2019-11-17 DIAGNOSIS — Z87.2 PERSONAL HISTORY OF DISEASES OF THE SKIN AND SUBCUTANEOUS TISSUE: Chronic | ICD-10-CM

## 2019-11-17 DIAGNOSIS — Z98.891 HISTORY OF UTERINE SCAR FROM PREVIOUS SURGERY: Chronic | ICD-10-CM

## 2019-11-17 DIAGNOSIS — Z98.890 OTHER SPECIFIED POSTPROCEDURAL STATES: Chronic | ICD-10-CM

## 2019-11-17 DIAGNOSIS — Z90.710 ACQUIRED ABSENCE OF BOTH CERVIX AND UTERUS: Chronic | ICD-10-CM

## 2019-11-17 PROCEDURE — A9585: CPT

## 2019-11-17 PROCEDURE — 72157 MRI CHEST SPINE W/O & W/DYE: CPT | Mod: 26

## 2019-11-17 PROCEDURE — 72157 MRI CHEST SPINE W/O & W/DYE: CPT

## 2020-02-21 ENCOUNTER — APPOINTMENT (OUTPATIENT)
Dept: ENDOCRINOLOGY | Facility: CLINIC | Age: 45
End: 2020-02-21
Payer: COMMERCIAL

## 2020-02-21 VITALS
DIASTOLIC BLOOD PRESSURE: 66 MMHG | BODY MASS INDEX: 31.45 KG/M2 | HEART RATE: 85 BPM | RESPIRATION RATE: 16 BRPM | SYSTOLIC BLOOD PRESSURE: 108 MMHG | OXYGEN SATURATION: 97 % | WEIGHT: 156 LBS | HEIGHT: 59 IN

## 2020-02-21 DIAGNOSIS — Z87.42 PERSONAL HISTORY OF OTHER DISEASES OF THE FEMALE GENITAL TRACT: ICD-10-CM

## 2020-02-21 DIAGNOSIS — R73.03 PREDIABETES.: ICD-10-CM

## 2020-02-21 DIAGNOSIS — Z13.820 ENCOUNTER FOR SCREENING FOR OSTEOPOROSIS: ICD-10-CM

## 2020-02-21 DIAGNOSIS — E22.1 HYPERPROLACTINEMIA: ICD-10-CM

## 2020-02-21 DIAGNOSIS — Z78.9 OTHER SPECIFIED HEALTH STATUS: ICD-10-CM

## 2020-02-21 PROCEDURE — 99205 OFFICE O/P NEW HI 60 MIN: CPT

## 2020-02-21 RX ORDER — MELATONIN 2.5MG/10ML
600 LIQUID (ML) ORAL
Qty: 30 | Refills: 6 | Status: ACTIVE | COMMUNITY
Start: 2020-02-21 | End: 1900-01-01

## 2020-02-21 NOTE — HISTORY OF PRESENT ILLNESS
[FreeTextEntry1] : 44 year  female referred for hormonal evaluation.\par She's reports a history of  PCOS and endometriosis, right oophorectomy. She's been on Lupron for 2 years, but stopped  in 2017 because of weight gain. She eventually underwent a total hysterectomy (left ovary is preserved) in 2017. She gained more than 30 lbs within that time, her a1c increased to  5.8. She developed a distal neuropathy, saw Dr. Baca and was also diagnosed with gastroparesis. She reports a history of GDM and prediabetes since her early 30's. She did not repeat a formal OGTT yet. She took metformin since [id number], and felt better on it.\par She experiences frequent bloating, nausea and vomiting, symptoms of near fainting if misses food.\par She reports 3 miscarriages \par There is no increased facial/body hair growth , scalp hair loss, facial acne. \par Denies: breast discharge, shoe/ring size change, easy bruising or new purple stretch marks on the abdomen/thighs. \par She denies any family history of pituitary disorders\par \par labs from neurology from  10/01/19\par a1c- 5.8, FSH/LH- 101/80\par am cortisol- 8.9, ACTH- 22.7\par Prolactin- 15.6\par GH < 0.1\par B12- 573\par TSH- 1.96\par \par labs from gyn from 1/22/20- 17OHP- 81, DHEAS- 65, estrogen- 984, FSH- 8.0/19.3, prolactin- 35.5\par \par Pituitary MRI (10/20/19)- pituitary stalk in midline. Pit gland is normal, no adenoma

## 2020-02-21 NOTE — CONSULT LETTER
[Dear  ___] : Dear  [unfilled], [Sincerely,] : Sincerely, [FreeTextEntry1] : Thank you for referring  Ms. CONSTANTIN BAUM to me for evaluation and treatment. Please, see attached consultation note. As always, if there are specific questions you would like to discuss, please feel free to contact me.\par Thank you for the courtesy of this evaluation.\par  [FreeTextEntry3] : Daniella Peralta MD, FACE, ECNU\par

## 2020-02-21 NOTE — ASSESSMENT
[Hypoglycemia Management] : hypoglycemia management [Carbohydrate Consistent Diet] : carbohydrate consistent diet [FreeTextEntry1] : - there is a discrepancy between blood work reported last month comparing from the one in 10/19. I'm not sure of the reason for isolated hyperprolactinemia at that time (she was not on any medications that could potentially cause it)\par - 24h UFC\par - repeat formal OGTT, and probably resume metformin\par - strict glycemic control given new diagnosis of neuropathy\par -  mg qd and consider starting Cymbalta (might benefit also given her underlying depression)\par - follow up with GI regarding her gastroparesis management\par Current approaches to prediabetes management are discussed with the patient. \par Target ranges for blood sugar, blood pressure and cholesterol reviewed, and risk reduction strategies verified. \par Hypoglycemia precautions reviewed with the patient. Suggested extensive diabetes education program, including nutritional and diabetes teaching and evaluation. Proper dietary restrictions and exercise routines discussed. \par Glucometer/SMBG and log book charting discussed.\par RTC post tests

## 2020-02-25 ENCOUNTER — APPOINTMENT (OUTPATIENT)
Dept: ENDOCRINOLOGY | Facility: CLINIC | Age: 45
End: 2020-02-25
Payer: COMMERCIAL

## 2020-02-25 PROCEDURE — 97802 MEDICAL NUTRITION INDIV IN: CPT

## 2020-02-28 LAB
C PEPTIDE SERPL-MCNC: 2.6 NG/ML
C PEPTIDE SERPL-MCNC: 9.1 NG/ML
ESTRADIOL SERPL-MCNC: 100 PG/ML
FSH SERPL-MCNC: 20 IU/L
GLUCOSE BS SERPL-MCNC: 90 MG/DL
GLUCOSE SERPL-MCNC: 100 MG/DL
HCG SERPL QL: NEGATIVE
INSULIN P FAST SERPL-ACNC: 13.5 UU/ML
INSULIN SERPL-MCNC: 66.2 UU/ML
LH SERPL-ACNC: 24.4 IU/L
PAPP-A SERPL-ACNC: <1 MIU/ML
PROLACTIN SERPL-MCNC: 15.4 NG/ML
T4 FREE SERPL-MCNC: 1 NG/DL
TSH SERPL-ACNC: 2.39 UIU/ML

## 2020-03-06 LAB
C PEPTIDE SERPL-MCNC: 4.8 NG/ML
CORTIS 24H UR-MCNC: 24 H
CORTIS 24H UR-MRATE: 23 MCG/24 H
CREAT 24H UR-MCNC: 1.1 G/24 H
CREAT ?TM UR-MCNC: 130 MG/DL
GLUCOSE SERPL-MCNC: 53 MG/DL
IGF-1 INTERP: NORMAL
IGF-1 INTERP: NORMAL
IGF-I BLD-MCNC: 155 NG/ML
IGF-I BLD-MCNC: 163 NG/ML
INSULIN SERPL-MCNC: 11.7 UU/ML
PROT ?TM UR-MCNC: 24 HR
SPECIMEN VOL 24H UR: 805 ML
SPECIMEN VOL 24H UR: 825 ML

## 2020-03-10 ENCOUNTER — APPOINTMENT (OUTPATIENT)
Dept: ENDOCRINOLOGY | Facility: CLINIC | Age: 45
End: 2020-03-10
Payer: COMMERCIAL

## 2020-03-10 PROCEDURE — 97803 MED NUTRITION INDIV SUBSEQ: CPT

## 2020-03-17 RX ORDER — METFORMIN ER 500 MG 500 MG/1
500 TABLET ORAL DAILY
Qty: 180 | Refills: 2 | Status: ACTIVE | COMMUNITY
Start: 2020-03-17 | End: 1900-01-01

## 2020-06-22 ENCOUNTER — APPOINTMENT (OUTPATIENT)
Dept: ENDOCRINOLOGY | Facility: CLINIC | Age: 45
End: 2020-06-22

## 2022-03-09 NOTE — BEHAVIORAL HEALTH ASSESSMENT NOTE - I HAVE PERSONALLY SEEN, EXAMINED, AND PARTICIPATED IN THE CARE OF THIS PATIENT.  I HAVE REVIEWED ALL PERTINENT CLINICAL INFORMATION, INCLUDING HISTORY, PHYSICAL EXAM, PLAN AND THE MEDICAL/PA/NP STUDENT’S NOTE AND AGREE EXCEPT AS NOTED.
Tried to connect with patient to reschedule appt on May 16th along with imaging Provider not in clinic that date. Sent a Outsmartt message as Voicemail was full  
Statement Selected

## 2022-04-25 ENCOUNTER — APPOINTMENT (OUTPATIENT)
Dept: GASTROENTEROLOGY | Facility: CLINIC | Age: 47
End: 2022-04-25
Payer: COMMERCIAL

## 2022-04-25 VITALS
WEIGHT: 157 LBS | BODY MASS INDEX: 31.65 KG/M2 | HEART RATE: 82 BPM | OXYGEN SATURATION: 97 % | HEIGHT: 59 IN | DIASTOLIC BLOOD PRESSURE: 78 MMHG | TEMPERATURE: 97.7 F | SYSTOLIC BLOOD PRESSURE: 110 MMHG

## 2022-04-25 DIAGNOSIS — R10.31 RIGHT LOWER QUADRANT PAIN: ICD-10-CM

## 2022-04-25 DIAGNOSIS — F41.9 ANXIETY DISORDER, UNSPECIFIED: ICD-10-CM

## 2022-04-25 DIAGNOSIS — R10.13 EPIGASTRIC PAIN: ICD-10-CM

## 2022-04-25 DIAGNOSIS — K31.84 GASTROPARESIS: ICD-10-CM

## 2022-04-25 DIAGNOSIS — Z12.11 ENCOUNTER FOR SCREENING FOR MALIGNANT NEOPLASM OF COLON: ICD-10-CM

## 2022-04-25 DIAGNOSIS — Z80.0 ENCOUNTER FOR SCREENING FOR MALIGNANT NEOPLASM OF COLON: ICD-10-CM

## 2022-04-25 PROCEDURE — 99205 OFFICE O/P NEW HI 60 MIN: CPT

## 2022-04-25 RX ORDER — SEMAGLUTIDE 0.68 MG/ML
INJECTION, SOLUTION SUBCUTANEOUS
Refills: 0 | Status: ACTIVE | COMMUNITY

## 2022-04-25 RX ORDER — ALBUTEROL SULFATE 90 UG/1
INHALANT RESPIRATORY (INHALATION)
Refills: 0 | Status: ACTIVE | COMMUNITY

## 2022-04-25 RX ORDER — SODIUM PICOSULFATE, MAGNESIUM OXIDE, AND ANHYDROUS CITRIC ACID 10; 3.5; 12 MG/160ML; G/160ML; G/160ML
10-3.5-12 MG-GM LIQUID ORAL
Qty: 1 | Refills: 0 | Status: COMPLETED | COMMUNITY
Start: 2022-04-25 | End: 2022-04-27

## 2022-04-25 RX ORDER — OMALIZUMAB 202.5 MG/1.4ML
INJECTION, SOLUTION SUBCUTANEOUS
Refills: 0 | Status: ACTIVE | COMMUNITY

## 2022-04-25 RX ORDER — VENLAFAXINE 37.5 MG/1
TABLET ORAL
Refills: 0 | Status: ACTIVE | COMMUNITY

## 2022-04-25 RX ORDER — LEVOTHYROXINE SODIUM 25 UG/1
25 TABLET ORAL
Refills: 0 | Status: ACTIVE | COMMUNITY

## 2022-04-25 NOTE — PHYSICAL EXAM
[General Appearance - In No Acute Distress] : in no acute distress [General Appearance - Alert] : alert [Neck Appearance] : the appearance of the neck was normal [Neck Cervical Mass (___cm)] : no neck mass was observed [Jugular Venous Distention Increased] : there was no jugular-venous distention [Thyroid Diffuse Enlargement] : the thyroid was not enlarged [Thyroid Nodule] : there were no palpable thyroid nodules [Auscultation Breath Sounds / Voice Sounds] : lungs were clear to auscultation bilaterally [Heart Rate And Rhythm] : heart rate was normal and rhythm regular [Heart Sounds] : normal S1 and S2 [Heart Sounds Gallop] : no gallops [Murmurs] : no murmurs [Heart Sounds Pericardial Friction Rub] : no pericardial rub [Full Pulse] : the pedal pulses are present [Edema] : there was no peripheral edema [Bowel Sounds] : normal bowel sounds [Abdomen Soft] : soft [] : no hepato-splenomegaly [Abdomen Mass (___ Cm)] : no abdominal mass palpated [Epigastric] : in the epigastric area [FreeTextEntry1] : Marked epigastric tenderness to deep palpation.

## 2022-04-25 NOTE — REVIEW OF SYSTEMS
[Recent Weight Gain (___ Lbs)] : recent [unfilled] ~Ulb weight gain [As Noted in HPI] : as noted in HPI [Anxiety] : anxiety [Negative] : Heme/Lymph

## 2022-04-25 NOTE — HISTORY OF PRESENT ILLNESS
[Heartburn] : denies heartburn [Nausea] : denies nausea [Vomiting] : denies vomiting [Diarrhea] : denies diarrhea [Yellow Skin Or Eyes (Jaundice)] : denies jaundice [Abdominal Pain] : abdominal pain worsened [Abdominal Swelling] : abdominal swelling worsened [Rectal Pain] : denies rectal pain [GERD] : gastroesophageal reflux disease [Hiatus Hernia] : hiatus hernia [Cholelithiasis] : cholelithiasis [Kidney Stone] : no kidney stone [Inflammatory Bowel Disease] : no inflammatory bowel disease [Irritable Bowel Syndrome] : irritable bowel syndrome [Diverticulitis] : no diverticulitis [Alcohol Abuse] : no alcohol abuse [Malignancy] : no malignancy [Abdominal Surgery] : abdominal surgery [Cholecystectomy] : cholecystectomy [de-identified] : 46-year-old female with PCOS, type 2 diabetes and family history of colon cancer here to establish care.  Has had numerous gastroenterologist over the years with numerous procedures done in the past history of chronic constipation has had 11 or 12 colonoscopies since the age of 17 as well as sits marker studies and what sounds like a defecography.  Has had multiple upper endoscopies and what sounds like a manometry.  She states she had a gastric emptying study which showed gastroparesis.  She had been on Reglan and domperidone from [place] which did not help at all.  She has been on numerous PPIs and H2 RA's as well as an extended trial of amitriptyline none of which helped her symptoms.  Her primary symptom is epigastric pain radiating to the right upper quadrant with abdominal bloating distention which is worse after meals.  She describes occasional dysphagia to solids.  No heartburn nausea vomiting or regurgitation.  Constipation has been less of a problem lately.  Her diabetes had been poorly controlled but is now on metformin and Ozempic and states that the numbers are better.  She has multiple family members with histories of colon cancer and several with histories of gastric cancer.  Her last endoscopy and colonoscopy were 5 years ago.  She says her colonoscopies were always normal she has never had polyps.  She states that upper endoscopy is just showed hiatus hernia.  No mention of retained food on any of these exams.  Patient is on low-dose venlafaxine for anxiety. [de-identified] : Patient states she had a hysterectomy and cholecystectomy

## 2022-04-25 NOTE — ASSESSMENT
[FreeTextEntry1] : Impression: Functional dyspepsia doubt gastroparesis.  Symptoms suggestive more of esophageal hypersensitivity.  IBS-C minimal symptoms currently.  Family history of colon cancer due for follow-up colonoscopy.\par \par Plan: Begin pantoprazole 40 mg every morning and desipramine 25 mg nightly.  Consider increasing desipramine dose after 1 to 2 weeks depending on response.  Schedule colonoscopy with Clenpiq prep.  Repeat EGD at the time also.

## 2022-05-02 DIAGNOSIS — Z01.818 ENCOUNTER FOR OTHER PREPROCEDURAL EXAMINATION: ICD-10-CM

## 2022-05-02 DIAGNOSIS — Z11.52 ENCOUNTER FOR SCREENING FOR COVID-19: ICD-10-CM

## 2022-05-28 ENCOUNTER — NON-APPOINTMENT (OUTPATIENT)
Age: 47
End: 2022-05-28

## 2022-06-03 ENCOUNTER — APPOINTMENT (OUTPATIENT)
Dept: GASTROENTEROLOGY | Facility: AMBULATORY MEDICAL SERVICES | Age: 47
End: 2022-06-03
Payer: COMMERCIAL

## 2022-06-03 PROCEDURE — 45378 DIAGNOSTIC COLONOSCOPY: CPT | Mod: 33

## 2022-06-03 PROCEDURE — 43235 EGD DIAGNOSTIC BRUSH WASH: CPT | Mod: 59

## 2022-06-03 RX ORDER — TRAZODONE HYDROCHLORIDE 50 MG/1
50 TABLET ORAL
Qty: 15 | Refills: 5 | Status: ACTIVE | COMMUNITY
Start: 2022-06-03 | End: 1900-01-01

## 2022-06-03 RX ORDER — DESIPRAMINE 25 MG/1
25 TABLET, FILM COATED ORAL AT BEDTIME
Qty: 60 | Refills: 5 | Status: DISCONTINUED | COMMUNITY
Start: 2022-04-25 | End: 2022-06-03

## 2022-09-26 ENCOUNTER — TRANSCRIPTION ENCOUNTER (OUTPATIENT)
Age: 47
End: 2022-09-26

## 2022-10-27 NOTE — CONSULT NOTE ADULT - PROBLEM SELECTOR RECOMMENDATION 9
Additional Notes: Patient Consent was obtained to proceed with the visit and recommended plan of care after discussion of all risks and benefits, including the risks of COVID-19 exposure. Render Risk Assessment In Note?: no Detail Level: Simple - FOE with mild erythema at the b/l Arytenoids consistent with GERD.   - c/w Pepcid.   - Call ENT with questions.

## 2022-10-27 NOTE — BEHAVIORAL HEALTH ASSESSMENT NOTE - ORIENTED TO SITUATION
Lab Results   Component Value Date    HGBA1C 7 8 (H) 10/13/2022   Under control  Continue current medication  We will re-evaluate at next office visit  Yes

## 2022-12-23 NOTE — PATIENT PROFILE ADULT - NSPROCHRONICPAINLOC_GEN_A_NUR
[] : negative sitting straight leg raise [de-identified] : lymphedema both legs [FreeTextEntry9] : sitting in WC; very limited exam IBS  Right foot pain/Right:/foot

## 2023-01-04 ENCOUNTER — NON-APPOINTMENT (OUTPATIENT)
Age: 48
End: 2023-01-04

## 2023-05-01 ENCOUNTER — RX RENEWAL (OUTPATIENT)
Age: 48
End: 2023-05-01

## 2023-05-01 RX ORDER — PANTOPRAZOLE 40 MG/1
40 TABLET, DELAYED RELEASE ORAL
Qty: 90 | Refills: 3 | Status: ACTIVE | COMMUNITY
Start: 2022-04-25 | End: 1900-01-01

## 2024-12-31 NOTE — REASON FOR VISIT
Patient stated she had fever of 101 earlier today and symptoms from yesterday are about the same today.   [Initial Eval - Existing Diagnosis] : an initial evaluation of an existing diagnosis